# Patient Record
Sex: MALE | Race: WHITE | HISPANIC OR LATINO | Employment: FULL TIME | URBAN - METROPOLITAN AREA
[De-identification: names, ages, dates, MRNs, and addresses within clinical notes are randomized per-mention and may not be internally consistent; named-entity substitution may affect disease eponyms.]

---

## 2018-02-13 ENCOUNTER — OFFICE VISIT (OUTPATIENT)
Dept: URGENT CARE | Facility: PHYSICIAN GROUP | Age: 43
End: 2018-02-13
Payer: COMMERCIAL

## 2018-02-13 ENCOUNTER — APPOINTMENT (OUTPATIENT)
Dept: RADIOLOGY | Facility: IMAGING CENTER | Age: 43
End: 2018-02-13
Attending: NURSE PRACTITIONER
Payer: COMMERCIAL

## 2018-02-13 VITALS
HEART RATE: 74 BPM | BODY MASS INDEX: 28.12 KG/M2 | OXYGEN SATURATION: 94 % | WEIGHT: 212.2 LBS | HEIGHT: 73 IN | SYSTOLIC BLOOD PRESSURE: 112 MMHG | DIASTOLIC BLOOD PRESSURE: 70 MMHG | TEMPERATURE: 99.9 F

## 2018-02-13 DIAGNOSIS — J06.9 URI WITH COUGH AND CONGESTION: ICD-10-CM

## 2018-02-13 DIAGNOSIS — J45.31 MILD PERSISTENT ASTHMA WITH EXACERBATION: ICD-10-CM

## 2018-02-13 DIAGNOSIS — R06.2 WHEEZE: ICD-10-CM

## 2018-02-13 DIAGNOSIS — R05.8 PRODUCTIVE COUGH: ICD-10-CM

## 2018-02-13 DIAGNOSIS — R50.9 FEVER, UNSPECIFIED FEVER CAUSE: ICD-10-CM

## 2018-02-13 DIAGNOSIS — R09.81 NASAL CONGESTION WITH RHINORRHEA: ICD-10-CM

## 2018-02-13 DIAGNOSIS — J34.89 NASAL CONGESTION WITH RHINORRHEA: ICD-10-CM

## 2018-02-13 PROCEDURE — 94640 AIRWAY INHALATION TREATMENT: CPT | Performed by: NURSE PRACTITIONER

## 2018-02-13 PROCEDURE — 94760 N-INVAS EAR/PLS OXIMETRY 1: CPT | Performed by: NURSE PRACTITIONER

## 2018-02-13 PROCEDURE — 99204 OFFICE O/P NEW MOD 45 MIN: CPT | Mod: 25 | Performed by: NURSE PRACTITIONER

## 2018-02-13 PROCEDURE — 71046 X-RAY EXAM CHEST 2 VIEWS: CPT | Mod: TC | Performed by: NURSE PRACTITIONER

## 2018-02-13 RX ORDER — ACETAMINOPHEN 500 MG
500 TABLET ORAL ONCE
Status: DISCONTINUED | OUTPATIENT
Start: 2018-02-13 | End: 2018-02-13

## 2018-02-13 RX ORDER — DOXYCYCLINE HYCLATE 100 MG
100 TABLET ORAL 2 TIMES DAILY
Qty: 14 TAB | Refills: 0 | Status: SHIPPED | OUTPATIENT
Start: 2018-02-13 | End: 2020-07-20

## 2018-02-13 RX ORDER — CODEINE PHOSPHATE AND GUAIFENESIN 10; 100 MG/5ML; MG/5ML
5 SOLUTION ORAL 2 TIMES DAILY PRN
Qty: 100 ML | Refills: 0 | Status: SHIPPED | OUTPATIENT
Start: 2018-02-13 | End: 2018-02-23

## 2018-02-13 RX ORDER — ALPRAZOLAM 0.25 MG/1
0.25 TABLET ORAL NIGHTLY PRN
COMMUNITY
End: 2022-01-28

## 2018-02-13 RX ORDER — IPRATROPIUM BROMIDE AND ALBUTEROL SULFATE 2.5; .5 MG/3ML; MG/3ML
3 SOLUTION RESPIRATORY (INHALATION) ONCE
Status: COMPLETED | OUTPATIENT
Start: 2018-02-13 | End: 2018-02-13

## 2018-02-13 RX ORDER — FLUTICASONE PROPIONATE 50 MCG
2 SPRAY, SUSPENSION (ML) NASAL DAILY
Qty: 1 BOTTLE | Refills: 0 | Status: SHIPPED | OUTPATIENT
Start: 2018-02-13 | End: 2020-07-20

## 2018-02-13 RX ORDER — FLUOXETINE HYDROCHLORIDE 20 MG/1
20 CAPSULE ORAL DAILY
COMMUNITY
End: 2021-05-06

## 2018-02-13 RX ORDER — ACETAMINOPHEN 500 MG
1000 TABLET ORAL ONCE
Status: COMPLETED | OUTPATIENT
Start: 2018-02-13 | End: 2018-02-13

## 2018-02-13 RX ORDER — ALBUTEROL SULFATE 90 UG/1
2 AEROSOL, METERED RESPIRATORY (INHALATION) EVERY 6 HOURS PRN
Qty: 8.5 G | Refills: 0 | Status: SHIPPED | OUTPATIENT
Start: 2018-02-13 | End: 2022-01-28

## 2018-02-13 RX ADMIN — IPRATROPIUM BROMIDE AND ALBUTEROL SULFATE 3 ML: 2.5; .5 SOLUTION RESPIRATORY (INHALATION) at 09:02

## 2018-02-13 RX ADMIN — Medication 1000 MG: at 09:10

## 2018-02-13 ASSESSMENT — ENCOUNTER SYMPTOMS
BACK PAIN: 0
FEVER: 1
SPUTUM PRODUCTION: 1
WHEEZING: 1
ABDOMINAL PAIN: 0
DIARRHEA: 0
NAUSEA: 0
VOMITING: 0
PALPITATIONS: 0
MYALGIAS: 1
HEADACHES: 0
SORE THROAT: 1
CONSTIPATION: 0
SINUS PAIN: 0
SHORTNESS OF BREATH: 1
COUGH: 1
WEAKNESS: 0
ORTHOPNEA: 0
EYE DISCHARGE: 0
EYE REDNESS: 0
DIZZINESS: 0
CHILLS: 1

## 2018-02-13 NOTE — PROGRESS NOTES
"Subjective:      Joaquin Donaldson is a 42 y.o. male who presents with Fever (chest congestion, ear ache, painful swallowing, phlem, x2 days )            HPI  Chest congestion, brown/yellow/green. Fever at last night- sweats at night. Dayquil/Nyquil. Nasal congestion, runny nose- same mucus. Sore throat, PND.     PMH:  has no past medical history on file.  MEDS:   Current Outpatient Prescriptions:   •  ALPRAZolam (XANAX) 0.25 MG Tab, Take 0.25 mg by mouth at bedtime as needed for Sleep., Disp: , Rfl:   •  FLUoxetine (PROZAC) 20 MG Cap, Take 20 mg by mouth every day., Disp: , Rfl:     Current Facility-Administered Medications:   •  acetaminophen (TYLENOL) tablet 1,000 mg, 1,000 mg, Oral, Once, Sylwia Donaldson, SORAIDA.N.P.  ALLERGIES: Not on File  SURGHX: History reviewed. No pertinent surgical history.  SOCHX:  reports that he has never smoked. He has never used smokeless tobacco. He reports that he does not drink alcohol or use drugs.  FH: Family history was reviewed, no pertinent findings to report    Review of Systems   Constitutional: Positive for chills, fever and malaise/fatigue.   HENT: Positive for congestion, ear pain and sore throat. Negative for sinus pain.    Eyes: Negative for discharge and redness.   Respiratory: Positive for cough, sputum production, shortness of breath and wheezing.    Cardiovascular: Negative for chest pain, palpitations and orthopnea.   Gastrointestinal: Negative for abdominal pain, constipation, diarrhea, nausea and vomiting.   Musculoskeletal: Positive for myalgias. Negative for back pain.   Neurological: Negative for dizziness, weakness and headaches.   All other systems reviewed and are negative.         Objective:     /70   Pulse 74   Temp 37.7 °C (99.9 °F)   Ht 1.854 m (6' 1\")   Wt 96.3 kg (212 lb 3.2 oz)   SpO2 94%   BMI 28.00 kg/m²      Physical Exam   Constitutional: He is oriented to person, place, and time. He appears well-developed and well-nourished. He is " active and cooperative.  Non-toxic appearance. He does not have a sickly appearance. He appears ill. No distress.   HENT:   Head: Normocephalic.   Right Ear: External ear and ear canal normal. Tympanic membrane is injected.   Left Ear: External ear and ear canal normal. Tympanic membrane is injected.   Nose: Mucosal edema and rhinorrhea present. No sinus tenderness.   Mouth/Throat: Uvula is midline. Mucous membranes are dry. No uvula swelling. Posterior oropharyngeal erythema present. No posterior oropharyngeal edema.   Eyes: Conjunctivae and EOM are normal. Pupils are equal, round, and reactive to light.   Neck: Normal range of motion. Neck supple.   Cardiovascular: Normal rate and regular rhythm.    Pulmonary/Chest: Effort normal and breath sounds normal. No accessory muscle usage. No respiratory distress. He has no decreased breath sounds. He has no wheezes. He has no rhonchi. He has no rales.   Musculoskeletal: Normal range of motion.   Lymphadenopathy:     He has no cervical adenopathy.   Neurological: He is alert and oriented to person, place, and time.   Skin: Skin is warm and dry. He is not diaphoretic.   Vitals reviewed.       Duo neb breathing treatment: Patient has chest tightness, wheeze without CP. Cough induced especially with deep breathing. After, patient states able to breathe deep without coughing. Air movement throughout. Post tx 94%.      CXR FINDINGS:  The heart is within normal limits in size. No focal consolidation, pleural effusion or pneumothorax is identified.  Costophrenic angles are sharp.     [facility] Aware web site evaluation: I have obtained and reviewed patient utilization report from [facility] pharmacy database prior to writing prescription for controlled substance II, III or IV. Based on the report and my clinical assessment the prescription is medically necessary    Assessment/Plan:     1. Productive cough    - ipratropium-albuterol (DUONEB) nebulizer solution 3 mL; 3 mL by  Nebulization route Once.  - DX-CHEST-2 VIEWS; Future  - albuterol 108 (90 Base) MCG/ACT Aero Soln inhalation aerosol; Inhale 2 Puffs by mouth every 6 hours as needed for Shortness of Breath.  Dispense: 8.5 g; Refill: 0  - guaifenesin-codeine (ROBITUSSIN AC) Solution oral solution; Take 5 mL by mouth 2 times a day as needed for Cough for up to 10 days. Causes drowsiness, do not drive or work while using  Dispense: 100 mL; Refill: 0    2. Wheeze    - ipratropium-albuterol (DUONEB) nebulizer solution 3 mL; 3 mL by Nebulization route Once.  - DX-CHEST-2 VIEWS; Future  - albuterol 108 (90 Base) MCG/ACT Aero Soln inhalation aerosol; Inhale 2 Puffs by mouth every 6 hours as needed for Shortness of Breath.  Dispense: 8.5 g; Refill: 0    3. Fever, unspecified fever cause    - acetaminophen (TYLENOL) tablet 1,000 mg; Take 2 Tabs by mouth Once.    4. Mild persistent asthma with exacerbation    - albuterol 108 (90 Base) MCG/ACT Aero Soln inhalation aerosol; Inhale 2 Puffs by mouth every 6 hours as needed for Shortness of Breath.  Dispense: 8.5 g; Refill: 0    5. URI with cough and congestion    - albuterol 108 (90 Base) MCG/ACT Aero Soln inhalation aerosol; Inhale 2 Puffs by mouth every 6 hours as needed for Shortness of Breath.  Dispense: 8.5 g; Refill: 0  - doxycycline (VIBRAMYCIN) 100 MG Tab; Take 1 Tab by mouth 2 times a day.  Dispense: 14 Tab; Refill: 0  - guaifenesin-codeine (ROBITUSSIN AC) Solution oral solution; Take 5 mL by mouth 2 times a day as needed for Cough for up to 10 days. Causes drowsiness, do not drive or work while using  Dispense: 100 mL; Refill: 0    6. Nasal congestion with rhinorrhea    - fluticasone (FLONASE) 50 MCG/ACT nasal spray; Spray 2 Sprays in nose every day.  Dispense: 1 Bottle; Refill: 0      Increase water intake  Get rest  May use Ibuprofen/Tylenol prn for any fever, body aches or throat pain  May use saline nasal spray for nasal congestion  May use Flonase or Nasocort for allergen nasal  congestion  May use Mucinex prn for productive cough  May gargle with salt water prn for throat discomfort  May drink smoothies for nutrition if too painful to swallow solid foods  Monitor for productive cough, SOB and chest pain/tightness- need re-evaluation  Work note provided

## 2018-02-13 NOTE — LETTER
February 13, 2018       Patient: Joaquin Donaldson   YOB: 1975   Date of Visit: 2/13/2018         To Whom It May Concern:    It is my medical opinion that Joaquin Donaldson be excused from work due to illness. May return on 2/15/18.    If you have any questions or concerns, please don't hesitate to call [phone]          Sincerely,          SORAIDA Henriquez.N.P.  Electronically Signed

## 2019-05-11 ENCOUNTER — OFFICE VISIT (OUTPATIENT)
Dept: URGENT CARE | Facility: PHYSICIAN GROUP | Age: 44
End: 2019-05-11
Payer: COMMERCIAL

## 2019-05-11 VITALS
SYSTOLIC BLOOD PRESSURE: 134 MMHG | HEIGHT: 73 IN | DIASTOLIC BLOOD PRESSURE: 90 MMHG | TEMPERATURE: 97.9 F | HEART RATE: 74 BPM | WEIGHT: 227.6 LBS | OXYGEN SATURATION: 95 % | BODY MASS INDEX: 30.17 KG/M2

## 2019-05-11 DIAGNOSIS — J40 BRONCHITIS: ICD-10-CM

## 2019-05-11 DIAGNOSIS — R06.2 WHEEZING: ICD-10-CM

## 2019-05-11 PROCEDURE — 99214 OFFICE O/P EST MOD 30 MIN: CPT | Performed by: PHYSICIAN ASSISTANT

## 2019-05-11 RX ORDER — DOXYCYCLINE HYCLATE 100 MG
100 TABLET ORAL 2 TIMES DAILY
Qty: 14 TAB | Refills: 0 | Status: SHIPPED | OUTPATIENT
Start: 2019-05-11 | End: 2019-05-18

## 2019-05-11 RX ORDER — ALBUTEROL SULFATE 90 UG/1
2 AEROSOL, METERED RESPIRATORY (INHALATION) EVERY 6 HOURS PRN
Qty: 8.5 G | Refills: 0 | Status: SHIPPED | OUTPATIENT
Start: 2019-05-11 | End: 2020-07-20

## 2019-05-11 ASSESSMENT — ENCOUNTER SYMPTOMS
DIARRHEA: 0
NECK PAIN: 0
COUGH: 1
HEADACHES: 0
SHORTNESS OF BREATH: 0
MYALGIAS: 0
RHINORRHEA: 1
WHEEZING: 1
DIZZINESS: 0
TINGLING: 0
CHILLS: 0
SORE THROAT: 1
FEVER: 0
VOMITING: 0
EYE REDNESS: 0
SPUTUM PRODUCTION: 1
EYE DISCHARGE: 0

## 2019-05-11 NOTE — LETTER
May 11, 2019         Patient: Joaquin Donaldson   YOB: 1975   Date of Visit: 5/11/2019           To Whom it May Concern:    Joaquin Donaldson was seen in my clinic on 5/11/2019. Please excuse this patient from work due to recent illness.     If you have any questions or concerns, please don't hesitate to call.        Sincerely,           Esau Lebron P.A.-C.  Electronically Signed

## 2019-05-11 NOTE — PROGRESS NOTES
"Subjective:      Joaquin Donaldson is a 43 y.o. male who presents with Cough (wheezing, tight congestion, phlem (yellow/red), x1 week )            Cough   This is a new problem. The current episode started 1 to 4 weeks ago. The problem has been waxing and waning. The problem occurs every few minutes. The cough is productive of sputum. Associated symptoms include nasal congestion, rhinorrhea, a sore throat and wheezing. Pertinent negatives include no chest pain, chills, ear pain, eye redness, fever, headaches, myalgias, rash or shortness of breath. Nothing aggravates the symptoms. He has tried OTC cough suppressant for the symptoms. The treatment provided mild relief. His past medical history is significant for bronchitis.       Review of Systems   Constitutional: Positive for malaise/fatigue. Negative for chills and fever.   HENT: Positive for congestion, rhinorrhea and sore throat. Negative for ear discharge and ear pain.    Eyes: Negative for discharge and redness.   Respiratory: Positive for cough, sputum production and wheezing. Negative for shortness of breath.    Cardiovascular: Negative for chest pain.   Gastrointestinal: Negative for diarrhea and vomiting.   Genitourinary: Negative for dysuria and urgency.   Musculoskeletal: Negative for myalgias and neck pain.   Skin: Negative for rash.   Neurological: Negative for dizziness, tingling and headaches.          Objective:     /90 (BP Location: Right arm, Patient Position: Sitting, BP Cuff Size: Adult)   Pulse 74   Temp 36.6 °C (97.9 °F) (Temporal)   Ht 1.854 m (6' 1\")   Wt 103.2 kg (227 lb 9.6 oz)   SpO2 95%   BMI 30.03 kg/m²    PMH:  has no past medical history on file.  MEDS:   Current Outpatient Prescriptions:   •  doxycycline (VIBRAMYCIN) 100 MG Tab, Take 1 Tab by mouth 2 times a day for 7 days., Disp: 14 Tab, Rfl: 0  •  albuterol 108 (90 Base) MCG/ACT Aero Soln inhalation aerosol, Inhale 2 Puffs by mouth every 6 hours as needed for Shortness " of Breath., Disp: 8.5 g, Rfl: 0  •  ALPRAZolam (XANAX) 0.25 MG Tab, Take 0.25 mg by mouth at bedtime as needed for Sleep., Disp: , Rfl:   •  FLUoxetine (PROZAC) 20 MG Cap, Take 20 mg by mouth every day., Disp: , Rfl:   •  albuterol 108 (90 Base) MCG/ACT Aero Soln inhalation aerosol, Inhale 2 Puffs by mouth every 6 hours as needed for Shortness of Breath., Disp: 8.5 g, Rfl: 0  •  fluticasone (FLONASE) 50 MCG/ACT nasal spray, Spray 2 Sprays in nose every day., Disp: 1 Bottle, Rfl: 0  •  doxycycline (VIBRAMYCIN) 100 MG Tab, Take 1 Tab by mouth 2 times a day., Disp: 14 Tab, Rfl: 0  ALLERGIES: Not on File  SURGHX: History reviewed. No pertinent surgical history.  SOCHX:  reports that he has never smoked. He has never used smokeless tobacco. He reports that he drinks alcohol. He reports that he does not use drugs.  FH: Family history was reviewed, no pertinent findings to report    Physical Exam   Constitutional: He is oriented to person, place, and time. He appears well-developed and well-nourished.   HENT:   Head: Normocephalic and atraumatic.   Mouth/Throat: No oropharyngeal exudate.   Ears- Canals clear- TM- with clear fluid effusions bilaterally.   Pos. PND, with slight erythema- without tonsillar edema or exudate.   Mild discharge noted bilaterally- to nares.      Eyes: Pupils are equal, round, and reactive to light. EOM are normal.   Neck: Normal range of motion. Neck supple.   Cardiovascular: Normal rate and regular rhythm.    No murmur heard.  Pulmonary/Chest: Effort normal. No respiratory distress. He has wheezes.   Musculoskeletal: Normal range of motion. He exhibits no tenderness.   Lymphadenopathy:     He has no cervical adenopathy.   Neurological: He is alert and oriented to person, place, and time.   Skin: Skin is warm. No rash noted.   Psychiatric: He has a normal mood and affect. His behavior is normal.   Vitals reviewed.              Assessment/Plan:     1. Bronchitis  - doxycycline (VIBRAMYCIN) 100 MG  Tab; Take 1 Tab by mouth 2 times a day for 7 days.  Dispense: 14 Tab; Refill: 0  - albuterol 108 (90 Base) MCG/ACT Aero Soln inhalation aerosol; Inhale 2 Puffs by mouth every 6 hours as needed for Shortness of Breath.  Dispense: 8.5 g; Refill: 0    2. Wheezing  - doxycycline (VIBRAMYCIN) 100 MG Tab; Take 1 Tab by mouth 2 times a day for 7 days.  Dispense: 14 Tab; Refill: 0  - albuterol 108 (90 Base) MCG/ACT Aero Soln inhalation aerosol; Inhale 2 Puffs by mouth every 6 hours as needed for Shortness of Breath.  Dispense: 8.5 g; Refill: 0    Also encouraged histamine today.  Discussed warning medication and the importance of avoiding sun.  Encouraged OTC supportive meds PRN. Humidification, increase fluids, avoid night time dairy.   Patient given precautionary s/sx that mandate immediate follow up and evaluation in the ED. Advised of risks of not doing so.    DDX, Supportive care, and indications for immediate follow-up discussed with patient.    Instructed to return to clinic or nearest emergency department if we are not available for any change in condition, further concerns, or worsening of symptoms.    The patient demonstrated a good understanding and agreed with the treatment plan.

## 2020-01-11 ENCOUNTER — OFFICE VISIT (OUTPATIENT)
Dept: URGENT CARE | Facility: PHYSICIAN GROUP | Age: 45
End: 2020-01-11
Payer: COMMERCIAL

## 2020-01-11 ENCOUNTER — APPOINTMENT (OUTPATIENT)
Dept: RADIOLOGY | Facility: IMAGING CENTER | Age: 45
End: 2020-01-11
Attending: PHYSICIAN ASSISTANT
Payer: COMMERCIAL

## 2020-01-11 VITALS
HEART RATE: 96 BPM | TEMPERATURE: 99.6 F | WEIGHT: 220.4 LBS | SYSTOLIC BLOOD PRESSURE: 128 MMHG | HEIGHT: 73 IN | BODY MASS INDEX: 29.21 KG/M2 | RESPIRATION RATE: 20 BRPM | DIASTOLIC BLOOD PRESSURE: 82 MMHG | OXYGEN SATURATION: 94 %

## 2020-01-11 DIAGNOSIS — J10.1 INFLUENZA A: ICD-10-CM

## 2020-01-11 DIAGNOSIS — J11.1 INFLUENZA-LIKE ILLNESS: ICD-10-CM

## 2020-01-11 DIAGNOSIS — R06.02 SHORTNESS OF BREATH: ICD-10-CM

## 2020-01-11 LAB
FLUAV+FLUBV AG SPEC QL IA: NORMAL
INT CON NEG: NEGATIVE
INT CON POS: POSITIVE

## 2020-01-11 PROCEDURE — 71046 X-RAY EXAM CHEST 2 VIEWS: CPT | Mod: TC | Performed by: PHYSICIAN ASSISTANT

## 2020-01-11 PROCEDURE — 99214 OFFICE O/P EST MOD 30 MIN: CPT | Performed by: PHYSICIAN ASSISTANT

## 2020-01-11 PROCEDURE — 87804 INFLUENZA ASSAY W/OPTIC: CPT | Performed by: PHYSICIAN ASSISTANT

## 2020-01-11 RX ORDER — CODEINE PHOSPHATE AND GUAIFENESIN 10; 100 MG/5ML; MG/5ML
5 SOLUTION ORAL
Qty: 35 ML | Refills: 0 | Status: SHIPPED | OUTPATIENT
Start: 2020-01-11 | End: 2020-01-18

## 2020-01-11 RX ORDER — OSELTAMIVIR PHOSPHATE 75 MG/1
75 CAPSULE ORAL 2 TIMES DAILY
Qty: 10 CAP | Refills: 0 | Status: SHIPPED | OUTPATIENT
Start: 2020-01-11 | End: 2020-07-20

## 2020-01-11 RX ORDER — METHYLPREDNISOLONE 4 MG/1
TABLET ORAL
Qty: 1 PACKAGE | Refills: 0 | Status: SHIPPED | OUTPATIENT
Start: 2020-01-11 | End: 2020-07-20

## 2020-01-11 RX ORDER — ALBUTEROL SULFATE 90 UG/1
2 AEROSOL, METERED RESPIRATORY (INHALATION) EVERY 6 HOURS PRN
Qty: 8.5 G | Refills: 0 | Status: SHIPPED | OUTPATIENT
Start: 2020-01-11 | End: 2020-07-20

## 2020-01-11 RX ORDER — IPRATROPIUM BROMIDE AND ALBUTEROL SULFATE 2.5; .5 MG/3ML; MG/3ML
3 SOLUTION RESPIRATORY (INHALATION) ONCE
Status: COMPLETED | OUTPATIENT
Start: 2020-01-11 | End: 2020-01-11

## 2020-01-11 RX ADMIN — IPRATROPIUM BROMIDE AND ALBUTEROL SULFATE 3 ML: 2.5; .5 SOLUTION RESPIRATORY (INHALATION) at 14:35

## 2020-01-11 ASSESSMENT — ENCOUNTER SYMPTOMS
CHANGE IN BOWEL HABIT: 0
EYE PAIN: 0
CHILLS: 1
DIARRHEA: 0
SHORTNESS OF BREATH: 1
SORE THROAT: 1
FATIGUE: 1
FEVER: 1
COUGH: 1
BLURRED VISION: 0
WHEEZING: 1
DIZZINESS: 0
SINUS PAIN: 0
NAUSEA: 1
ABDOMINAL PAIN: 0
SPUTUM PRODUCTION: 1
PALPITATIONS: 0
MYALGIAS: 1
VOMITING: 0
HEADACHES: 1

## 2020-01-11 NOTE — PROGRESS NOTES
Subjective:      Joaquin Donaldson is a 44 y.o. male who presents with Cough (chest pains when he coughs, coughing up mucous, very painful , wheezing, he thinks he has bronchitis, chills, hot sweats, fevers, sore throat, x3 days )      Influenza   This is a new problem. The current episode started in the past 7 days (3 DAYS). The problem occurs constantly. The problem has been gradually worsening. Associated symptoms include chest pain (with coughing and taking deep breaths), chills, congestion, coughing, fatigue, a fever, headaches, myalgias, nausea and a sore throat. Pertinent negatives include no abdominal pain, change in bowel habit, rash or vomiting. The symptoms are aggravated by coughing (deep breaths). He has tried NSAIDs and sleep (Mucinex, Robitussin) for the symptoms. The treatment provided no relief.       Review of Systems   Constitutional: Positive for chills, fatigue, fever and malaise/fatigue.   HENT: Positive for congestion and sore throat. Negative for ear pain and sinus pain.    Eyes: Negative for blurred vision and pain.   Respiratory: Positive for cough, sputum production, shortness of breath and wheezing.    Cardiovascular: Positive for chest pain (with coughing and taking deep breaths). Negative for palpitations.   Gastrointestinal: Positive for nausea. Negative for abdominal pain, change in bowel habit, diarrhea and vomiting.   Musculoskeletal: Positive for myalgias.   Skin: Negative for rash.   Neurological: Positive for headaches. Negative for dizziness.       PMH:  has no past medical history on file.  MEDS:   Current Outpatient Medications:   •  oseltamivir (TAMIFLU) 75 MG Cap, Take 1 Cap by mouth 2 times a day., Disp: 10 Cap, Rfl: 0  •  guaifenesin-codeine (ROBITUSSIN AC) Solution oral solution, Take 5 mL by mouth at bedtime as needed for Cough for up to 7 days., Disp: 35 mL, Rfl: 0  •  methylPREDNISolone (MEDROL DOSEPAK) 4 MG Tablet Therapy Pack, Take as directed, Disp: 1 Package, Rfl:  "0  •  albuterol 108 (90 Base) MCG/ACT Aero Soln inhalation aerosol, Inhale 2 Puffs by mouth every 6 hours as needed for Shortness of Breath., Disp: 8.5 g, Rfl: 0  •  albuterol 108 (90 Base) MCG/ACT Aero Soln inhalation aerosol, Inhale 2 Puffs by mouth every 6 hours as needed for Shortness of Breath., Disp: 8.5 g, Rfl: 0  •  FLUoxetine (PROZAC) 20 MG Cap, Take 20 mg by mouth every day., Disp: , Rfl:   •  albuterol 108 (90 Base) MCG/ACT Aero Soln inhalation aerosol, Inhale 2 Puffs by mouth every 6 hours as needed for Shortness of Breath., Disp: 8.5 g, Rfl: 0  •  ALPRAZolam (XANAX) 0.25 MG Tab, Take 0.25 mg by mouth at bedtime as needed for Sleep., Disp: , Rfl:   •  fluticasone (FLONASE) 50 MCG/ACT nasal spray, Spray 2 Sprays in nose every day. (Patient not taking: Reported on 1/11/2020), Disp: 1 Bottle, Rfl: 0  •  doxycycline (VIBRAMYCIN) 100 MG Tab, Take 1 Tab by mouth 2 times a day. (Patient not taking: Reported on 1/11/2020), Disp: 14 Tab, Rfl: 0  ALLERGIES: No Known Allergies  SURGHX: History reviewed. No pertinent surgical history.  SOCHX:  reports that he has never smoked. He has never used smokeless tobacco. He reports current alcohol use. He reports that he does not use drugs.  FH: Family history was reviewed, no pertinent findings to report     Objective:     /82 (BP Location: Left arm, Patient Position: Sitting, BP Cuff Size: Adult)   Pulse 96   Temp 37.6 °C (99.6 °F) (Temporal)   Resp 20   Ht 1.854 m (6' 1\")   Wt 100 kg (220 lb 6.4 oz)   SpO2 94%   BMI 29.08 kg/m²      Physical Exam  Constitutional:       Appearance: He is well-developed.   HENT:      Head: Normocephalic and atraumatic.      Right Ear: Tympanic membrane, ear canal and external ear normal.      Left Ear: Tympanic membrane, ear canal and external ear normal.      Nose: Mucosal edema, congestion and rhinorrhea present. Rhinorrhea is clear.      Right Sinus: No maxillary sinus tenderness or frontal sinus tenderness.      Left " Sinus: No maxillary sinus tenderness or frontal sinus tenderness.      Mouth/Throat:      Lips: Pink.      Mouth: Mucous membranes are moist.      Pharynx: Posterior oropharyngeal erythema present.   Eyes:      Conjunctiva/sclera: Conjunctivae normal.      Pupils: Pupils are equal, round, and reactive to light.   Neck:      Musculoskeletal: Normal range of motion.   Cardiovascular:      Rate and Rhythm: Normal rate and regular rhythm.      Heart sounds: Normal heart sounds. No murmur.   Pulmonary:      Effort: Pulmonary effort is normal.      Breath sounds: Wheezing present. No decreased breath sounds, rhonchi or rales.   Lymphadenopathy:      Cervical: No cervical adenopathy.   Skin:     General: Skin is warm and dry.      Capillary Refill: Capillary refill takes less than 2 seconds.   Neurological:      Mental Status: He is alert and oriented to person, place, and time.   Psychiatric:         Behavior: Behavior normal.         Judgment: Judgment normal.         POCT Influenza A/B - POSITIVE for influenza A    DX-CHEST-2 VIEWS  IMPRESSION:     No radiographic evidence of acute cardiopulmonary process.        *Repeat pulse ox s/p nebulizer treatment was 97% on room air.  Patient was breathing easier but wheezes were still heard diffusely.     Assessment/Plan:       1. Influenza A  - POCT Influenza A/B  - DX-CHEST-2 VIEWS; Future  - ipratropium-albuterol (DUONEB) nebulizer solution  - oseltamivir (TAMIFLU) 75 MG Cap; Take 1 Cap by mouth 2 times a day.  Dispense: 10 Cap; Refill: 0  - guaifenesin-codeine (ROBITUSSIN AC) Solution oral solution; Take 5 mL by mouth at bedtime as needed for Cough for up to 7 days.  Dispense: 35 mL; Refill: 0  - methylPREDNISolone (MEDROL DOSEPAK) 4 MG Tablet Therapy Pack; Take as directed  Dispense: 1 Package; Refill: 0    2. Shortness of breath  - POCT Influenza A/B  - DX-CHEST-2 VIEWS; Future  - ipratropium-albuterol (DUONEB) nebulizer solution  - albuterol 108 (90 Base) MCG/ACT Aero  Soln inhalation aerosol; Inhale 2 Puffs by mouth every 6 hours as needed for Shortness of Breath.  Dispense: 8.5 g; Refill: 0            Differential Diagnosis, natural history, and supportive care discussed. Return to the Urgent Care or follow up with your PCP if symptoms fail to resolve, or for any new or worsening symptoms. Emergency room precautions discussed. Patient and/or family appears understanding of information.

## 2020-01-21 ENCOUNTER — OFFICE VISIT (OUTPATIENT)
Dept: URGENT CARE | Facility: PHYSICIAN GROUP | Age: 45
End: 2020-01-21
Payer: COMMERCIAL

## 2020-01-21 VITALS
WEIGHT: 220 LBS | OXYGEN SATURATION: 99 % | HEIGHT: 73 IN | DIASTOLIC BLOOD PRESSURE: 76 MMHG | RESPIRATION RATE: 16 BRPM | TEMPERATURE: 97.9 F | BODY MASS INDEX: 29.16 KG/M2 | SYSTOLIC BLOOD PRESSURE: 118 MMHG | HEART RATE: 84 BPM

## 2020-01-21 DIAGNOSIS — R05.9 COUGH: Primary | ICD-10-CM

## 2020-01-21 DIAGNOSIS — J98.01 BRONCHOSPASM, ACUTE: ICD-10-CM

## 2020-01-21 DIAGNOSIS — R04.2 BLOOD-TINGED SPUTUM: ICD-10-CM

## 2020-01-21 PROCEDURE — 99214 OFFICE O/P EST MOD 30 MIN: CPT | Performed by: PHYSICIAN ASSISTANT

## 2020-01-21 RX ORDER — BENZONATATE 200 MG/1
200 CAPSULE ORAL 3 TIMES DAILY PRN
Qty: 60 CAP | Refills: 0 | Status: SHIPPED | OUTPATIENT
Start: 2020-01-21 | End: 2020-07-20

## 2020-01-21 RX ORDER — CODEINE PHOSPHATE/GUAIFENESIN 10-100MG/5
10 LIQUID (ML) ORAL 4 TIMES DAILY PRN
Qty: 200 ML | Refills: 0 | Status: SHIPPED | OUTPATIENT
Start: 2020-01-21 | End: 2020-01-26

## 2020-01-21 ASSESSMENT — ENCOUNTER SYMPTOMS
SPUTUM PRODUCTION: 1
RHINORRHEA: 0
WHEEZING: 0
FEVER: 0
SHORTNESS OF BREATH: 0
HEMOPTYSIS: 1
COUGH: 1

## 2020-01-21 ASSESSMENT — COPD QUESTIONNAIRES: COPD: 0

## 2020-01-21 NOTE — PATIENT INSTRUCTIONS
Hemoptysis  Hemoptysis means coughing up blood from your airways or lungs. The most common cause of hemoptysis is the least serious. It is usually a ruptured small blood vessel caused by coughing or an infection. In some cases, the cause of hemoptysis is not known. Hemoptysis may also be a sign of a more serious problem, such as cancer, pneumonia, a blood clot, or other types of lung disease. You should always contact a caregiver if you develop hemoptysis. This is important, as even mild cases of hemoptysis may lead to serious breathing problems. Major bleeding from the airway is considered a medical emergency, and needs to be evaluated and managed promptly to avoid complications, disability, or death.  Your caregiver may perform tests to find out if the bleeding is coming from your lungs. Some of these tests may include:  · A chest X-ray.   · A computerized X-ray scan (CT scan or CAT scan).   · Bronchoscopy. This test uses a flexible tube (a bronchoscope) to see inside the lungs.   TREATMENT   · Treatment for hemoptysis depends on the cause. It also depends on the quantity of blood. Infrequent, mild hemoptysis usually does not require specific, immediate treatment.   · If the cause of hemoptysis is unknown, treatment may involve monitoring for at least 2 or 3 years. If you have a normal chest X-ray and bronchoscopy, the hemoptysis usually clears within 6 months.   SEEK MEDICAL CARE IF:   · For follow-up care as directed.   · If your symptoms are not improving or are getting worse.   · If you have any other questions or concerns.   SEEK IMMEDIATE MEDICAL CARE IF:   · You begin to cough up large amounts of blood.   · You develop problems with your breathing.   · You begin vomiting blood or see blood in your stool.   · You develop chest pain.   · You feel faint or pass out.   · You develop a fever over 102° F (38.9° C), or as your caregiver suggests.   Document Released: 01/25/2006 Document Revised: 03/11/2013  Document Reviewed: 05/03/2011  [employer]® Patient Information ©2013 [employer].

## 2020-01-21 NOTE — PROGRESS NOTES
Subjective:      Joaquin Donaldson is a 44 y.o. male who presents with Cough (not getting better, spitting up bloody mucus )    PMH:  has no past medical history on file.  MEDS:   Current Outpatient Medications:   •  benzonatate (TESSALON) 200 MG capsule, Take 1 Cap by mouth 3 times a day as needed for Cough., Disp: 60 Cap, Rfl: 0  •  guaifenesin-codeine (TUSSI-ORGANIDIN NR) 100-10 MG/5ML syrup, Take 10 mL by mouth 4 times a day as needed for up to 5 days., Disp: 200 mL, Rfl: 0  •  oseltamivir (TAMIFLU) 75 MG Cap, Take 1 Cap by mouth 2 times a day., Disp: 10 Cap, Rfl: 0  •  methylPREDNISolone (MEDROL DOSEPAK) 4 MG Tablet Therapy Pack, Take as directed, Disp: 1 Package, Rfl: 0  •  albuterol 108 (90 Base) MCG/ACT Aero Soln inhalation aerosol, Inhale 2 Puffs by mouth every 6 hours as needed for Shortness of Breath., Disp: 8.5 g, Rfl: 0  •  albuterol 108 (90 Base) MCG/ACT Aero Soln inhalation aerosol, Inhale 2 Puffs by mouth every 6 hours as needed for Shortness of Breath., Disp: 8.5 g, Rfl: 0  •  ALPRAZolam (XANAX) 0.25 MG Tab, Take 0.25 mg by mouth at bedtime as needed for Sleep., Disp: , Rfl:   •  FLUoxetine (PROZAC) 20 MG Cap, Take 20 mg by mouth every day., Disp: , Rfl:   •  albuterol 108 (90 Base) MCG/ACT Aero Soln inhalation aerosol, Inhale 2 Puffs by mouth every 6 hours as needed for Shortness of Breath., Disp: 8.5 g, Rfl: 0  •  fluticasone (FLONASE) 50 MCG/ACT nasal spray, Spray 2 Sprays in nose every day. (Patient not taking: Reported on 1/11/2020), Disp: 1 Bottle, Rfl: 0  •  doxycycline (VIBRAMYCIN) 100 MG Tab, Take 1 Tab by mouth 2 times a day. (Patient not taking: Reported on 1/11/2020), Disp: 14 Tab, Rfl: 0  ALLERGIES: No Known Allergies  SURGHX: History reviewed. No pertinent surgical history.  SOCHX:  reports that he has never smoked. He has never used smokeless tobacco. He reports current alcohol use. He reports that he does not use drugs.  FH: Reviewed with patient, not pertinent to this visit.        "    Patient presents with:  Cough: Patient was diagnosed with influenza 10 days ago, the remainder of the symptoms have improved and mostly resolved but the cough has been persistent.  Patient states occasionally his sputum is pink-tinged or sometimes has speckles of blood in it.  Patient states he thinks is just from his throat or esophagus as it is pretty sore from coughing but he thought he should come in to be evaluated.  Patient denies fever, chills, purulent or brown phlegm.    Cough   This is a new problem. Episode onset: 10 days. The problem has been unchanged. The problem occurs every few minutes. The cough is productive of blood-tinged sputum. Associated symptoms include hemoptysis and postnasal drip. Pertinent negatives include no fever, rhinorrhea, shortness of breath or wheezing. The symptoms are aggravated by lying down and cold air. He has tried body position changes and OTC cough suppressant for the symptoms. The treatment provided mild relief. There is no history of bronchitis, COPD or pneumonia.       Review of Systems   Constitutional: Negative for fever.   HENT: Positive for postnasal drip. Negative for rhinorrhea.    Respiratory: Positive for cough, hemoptysis and sputum production. Negative for shortness of breath and wheezing.    All other systems reviewed and are negative.         Objective:     /76   Pulse 84   Temp 36.6 °C (97.9 °F)   Resp 16   Ht 1.854 m (6' 1\")   Wt 99.8 kg (220 lb)   SpO2 99%   BMI 29.03 kg/m²      Physical Exam  Vitals signs and nursing note reviewed.   Constitutional:       General: He is not in acute distress.     Appearance: He is well-developed. He is not toxic-appearing.   HENT:      Head: Normocephalic and atraumatic.      Right Ear: Tympanic membrane normal.      Left Ear: Tympanic membrane normal.      Nose: Mucosal edema present.      Mouth/Throat:      Lips: Pink.      Mouth: Mucous membranes are moist.      Pharynx: Uvula midline. Posterior " oropharyngeal erythema present. No oropharyngeal exudate or uvula swelling.   Eyes:      Extraocular Movements: Extraocular movements intact.      Conjunctiva/sclera: Conjunctivae normal.      Pupils: Pupils are equal, round, and reactive to light.   Neck:      Musculoskeletal: Normal range of motion and neck supple.   Cardiovascular:      Rate and Rhythm: Normal rate and regular rhythm.      Heart sounds: Normal heart sounds.   Pulmonary:      Effort: Pulmonary effort is normal.      Breath sounds: No stridor. No decreased breath sounds, wheezing or rhonchi.   Abdominal:      Palpations: Abdomen is soft.   Musculoskeletal: Normal range of motion.   Lymphadenopathy:      Cervical: No cervical adenopathy.   Skin:     General: Skin is warm and dry.      Capillary Refill: Capillary refill takes less than 2 seconds.   Neurological:      General: No focal deficit present.      Mental Status: He is alert and oriented to person, place, and time.      Gait: Gait normal.   Psychiatric:         Mood and Affect: Mood normal.         Behavior: Behavior is cooperative.              Assessment/Plan:     1. Cough  benzonatate (TESSALON) 200 MG capsule    guaifenesin-codeine (TUSSI-ORGANIDIN NR) 100-10 MG/5ML syrup   2. Bronchospasm, acute  benzonatate (TESSALON) 200 MG capsule    guaifenesin-codeine (TUSSI-ORGANIDIN NR) 100-10 MG/5ML syrup   3. Blood-tinged sputum  benzonatate (TESSALON) 200 MG capsule    guaifenesin-codeine (TUSSI-ORGANIDIN NR) 100-10 MG/5ML syrup     Blood-tinged sputum is likely coming from patient's throat or esophagus as he has been coughing almost constantly for 2 weeks.    Patient declined chest x-ray at this visit today, states he had a chest x-ray at his last visit which was negative and does not feel worse respiratory wise, again he was just concerned about the blood-tinged sputum.    Patient given a prescription for benzonatate for his cough that he can actually take at work which she could not do with  the codeine cough medicine.    Patient given another prescription for codeine cough medicine for bedtime.  PT instructed not to drive or operate heavy machinery or drink alcohol while taking this medication because it contains either a narcotic or benzodiazepines which causes drowsiness. PT verbalized understanding of these instructions.     [facility] Aware web site evaluation: I have obtained and reviewed patient utilization report from [facility] database prior to writing prescription for controlled substance.  No history of abuse.    PT should follow up with PCP in 1-2 days for re-evaluation if symptoms have not improved.  Discussed red flags and reasons to return to  or ED.  Pt and/or family verbalized understanding of diagnosis and follow up instructions and was offered informational handout on diagnosis.  PT discharged.

## 2020-07-02 ENCOUNTER — HOSPITAL ENCOUNTER (OUTPATIENT)
Dept: RADIOLOGY | Facility: MEDICAL CENTER | Age: 45
End: 2020-07-02
Attending: SURGERY | Admitting: SURGERY
Payer: COMMERCIAL

## 2020-07-02 DIAGNOSIS — K40.91 UNILATERAL RECURRENT INGUINAL HERNIA WITHOUT OBSTRUCTION OR GANGRENE: ICD-10-CM

## 2020-07-02 PROCEDURE — 76857 US EXAM PELVIC LIMITED: CPT

## 2020-07-20 ENCOUNTER — OFFICE VISIT (OUTPATIENT)
Dept: ADMISSIONS | Facility: MEDICAL CENTER | Age: 45
End: 2020-07-20
Attending: SURGERY
Payer: COMMERCIAL

## 2020-07-20 DIAGNOSIS — Z01.812 PRE-OPERATIVE LABORATORY EXAMINATION: ICD-10-CM

## 2020-07-20 LAB
ANION GAP SERPL CALC-SCNC: 9 MMOL/L (ref 7–16)
BASOPHILS # BLD AUTO: 0.5 % (ref 0–1.8)
BASOPHILS # BLD: 0.05 K/UL (ref 0–0.12)
BUN SERPL-MCNC: 13 MG/DL (ref 8–22)
CALCIUM SERPL-MCNC: 9.2 MG/DL (ref 8.5–10.5)
CHLORIDE SERPL-SCNC: 104 MMOL/L (ref 96–112)
CO2 SERPL-SCNC: 25 MMOL/L (ref 20–33)
COVID ORDER STATUS COVID19: NORMAL
CREAT SERPL-MCNC: 1.01 MG/DL (ref 0.5–1.4)
EOSINOPHIL # BLD AUTO: 0.03 K/UL (ref 0–0.51)
EOSINOPHIL NFR BLD: 0.3 % (ref 0–6.9)
ERYTHROCYTE [DISTWIDTH] IN BLOOD BY AUTOMATED COUNT: 40.1 FL (ref 35.9–50)
GLUCOSE SERPL-MCNC: 99 MG/DL (ref 65–99)
HCT VFR BLD AUTO: 53.9 % (ref 42–52)
HGB BLD-MCNC: 18.4 G/DL (ref 14–18)
IMM GRANULOCYTES # BLD AUTO: 0.04 K/UL (ref 0–0.11)
IMM GRANULOCYTES NFR BLD AUTO: 0.4 % (ref 0–0.9)
LYMPHOCYTES # BLD AUTO: 1.29 K/UL (ref 1–4.8)
LYMPHOCYTES NFR BLD: 12.9 % (ref 22–41)
MCH RBC QN AUTO: 31.3 PG (ref 27–33)
MCHC RBC AUTO-ENTMCNC: 34.1 G/DL (ref 33.7–35.3)
MCV RBC AUTO: 91.7 FL (ref 81.4–97.8)
MONOCYTES # BLD AUTO: 0.41 K/UL (ref 0–0.85)
MONOCYTES NFR BLD AUTO: 4.1 % (ref 0–13.4)
NEUTROPHILS # BLD AUTO: 8.16 K/UL (ref 1.82–7.42)
NEUTROPHILS NFR BLD: 81.8 % (ref 44–72)
NRBC # BLD AUTO: 0 K/UL
NRBC BLD-RTO: 0 /100 WBC
PLATELET # BLD AUTO: 210 K/UL (ref 164–446)
PMV BLD AUTO: 10.3 FL (ref 9–12.9)
POTASSIUM SERPL-SCNC: 3.9 MMOL/L (ref 3.6–5.5)
RBC # BLD AUTO: 5.88 M/UL (ref 4.7–6.1)
SODIUM SERPL-SCNC: 138 MMOL/L (ref 135–145)
WBC # BLD AUTO: 10 K/UL (ref 4.8–10.8)

## 2020-07-20 PROCEDURE — 85025 COMPLETE CBC W/AUTO DIFF WBC: CPT

## 2020-07-20 PROCEDURE — 36415 COLL VENOUS BLD VENIPUNCTURE: CPT

## 2020-07-20 PROCEDURE — 80048 BASIC METABOLIC PNL TOTAL CA: CPT

## 2020-07-20 PROCEDURE — U0003 INFECTIOUS AGENT DETECTION BY NUCLEIC ACID (DNA OR RNA); SEVERE ACUTE RESPIRATORY SYNDROME CORONAVIRUS 2 (SARS-COV-2) (CORONAVIRUS DISEASE [COVID-19]), AMPLIFIED PROBE TECHNIQUE, MAKING USE OF HIGH THROUGHPUT TECHNOLOGIES AS DESCRIBED BY CMS-2020-01-R: HCPCS

## 2020-07-20 SDOH — HEALTH STABILITY: MENTAL HEALTH: HOW MANY STANDARD DRINKS CONTAINING ALCOHOL DO YOU HAVE ON A TYPICAL DAY?: 1 OR 2

## 2020-07-20 SDOH — HEALTH STABILITY: MENTAL HEALTH: HOW OFTEN DO YOU HAVE A DRINK CONTAINING ALCOHOL?: 4 OR MORE TIMES A WEEK

## 2020-07-21 LAB
SARS-COV-2 RNA RESP QL NAA+PROBE: NOTDETECTED
SPECIMEN SOURCE: NORMAL

## 2020-07-22 NOTE — OR NURSING
COVID-19 Pre-surgery screenin. Do you have an undiagnosed respiratory illness or symptoms such as coughing or sneezing? No  a. Onset of Sx No  b. Acute vs. chronic respiratory illness No    2. Do you have an unexplained fever greater than 100.4 degrees Fahrenheit or 38 degrees Celsius?     No    3. Have you had direct exposure to a patient who tested positive for Covid-19?    No     4. Have you traveled outside [facility] in the last 14 days? No    5. Have you had any loss of your sense of taste or smell? Have you had N/V or sore throat? No    Patient has been informed of visitor policy and asked to wear a mask upon entering the hospital   Yes

## 2020-07-23 ENCOUNTER — HOSPITAL ENCOUNTER (OUTPATIENT)
Facility: MEDICAL CENTER | Age: 45
End: 2020-07-23
Attending: SURGERY | Admitting: SURGERY
Payer: COMMERCIAL

## 2020-07-23 ENCOUNTER — ANESTHESIA EVENT (OUTPATIENT)
Dept: SURGERY | Facility: MEDICAL CENTER | Age: 45
End: 2020-07-23
Payer: COMMERCIAL

## 2020-07-23 ENCOUNTER — ANESTHESIA (OUTPATIENT)
Dept: SURGERY | Facility: MEDICAL CENTER | Age: 45
End: 2020-07-23
Payer: COMMERCIAL

## 2020-07-23 VITALS
HEART RATE: 59 BPM | TEMPERATURE: 97 F | SYSTOLIC BLOOD PRESSURE: 108 MMHG | WEIGHT: 198.41 LBS | RESPIRATION RATE: 16 BRPM | OXYGEN SATURATION: 96 % | BODY MASS INDEX: 26.3 KG/M2 | DIASTOLIC BLOOD PRESSURE: 87 MMHG | HEIGHT: 73 IN

## 2020-07-23 DIAGNOSIS — G89.18 POST-OP PAIN: ICD-10-CM

## 2020-07-23 PROBLEM — K40.20 BILATERAL INGUINAL HERNIA WITHOUT OBSTRUCTION OR GANGRENE: Status: ACTIVE | Noted: 2020-07-23

## 2020-07-23 PROBLEM — F17.200 SMOKER: Status: ACTIVE | Noted: 2020-07-23

## 2020-07-23 PROBLEM — F12.90 MARIJUANA SMOKER: Status: ACTIVE | Noted: 2020-07-23

## 2020-07-23 PROBLEM — F17.200 TOBACCO DEPENDENCY: Status: ACTIVE | Noted: 2020-07-23

## 2020-07-23 PROCEDURE — 700105 HCHG RX REV CODE 258: Performed by: SURGERY

## 2020-07-23 PROCEDURE — 160009 HCHG ANES TIME/MIN: Performed by: SURGERY

## 2020-07-23 PROCEDURE — 160036 HCHG PACU - EA ADDL 30 MINS PHASE I: Performed by: SURGERY

## 2020-07-23 PROCEDURE — 160025 RECOVERY II MINUTES (STATS): Performed by: SURGERY

## 2020-07-23 PROCEDURE — A9270 NON-COVERED ITEM OR SERVICE: HCPCS | Performed by: ANESTHESIOLOGY

## 2020-07-23 PROCEDURE — 502714 HCHG ROBOTIC SURGERY SERVICES: Performed by: SURGERY

## 2020-07-23 PROCEDURE — 501838 HCHG SUTURE GENERAL: Performed by: SURGERY

## 2020-07-23 PROCEDURE — 160041 HCHG SURGERY MINUTES - EA ADDL 1 MIN LEVEL 4: Performed by: SURGERY

## 2020-07-23 PROCEDURE — 700111 HCHG RX REV CODE 636 W/ 250 OVERRIDE (IP): Performed by: ANESTHESIOLOGY

## 2020-07-23 PROCEDURE — 160048 HCHG OR STATISTICAL LEVEL 1-5: Performed by: SURGERY

## 2020-07-23 PROCEDURE — 160002 HCHG RECOVERY MINUTES (STAT): Performed by: SURGERY

## 2020-07-23 PROCEDURE — A9270 NON-COVERED ITEM OR SERVICE: HCPCS

## 2020-07-23 PROCEDURE — 160029 HCHG SURGERY MINUTES - 1ST 30 MINS LEVEL 4: Performed by: SURGERY

## 2020-07-23 PROCEDURE — 160046 HCHG PACU - 1ST 60 MINS PHASE II: Performed by: SURGERY

## 2020-07-23 PROCEDURE — 700101 HCHG RX REV CODE 250: Performed by: SURGERY

## 2020-07-23 PROCEDURE — 700102 HCHG RX REV CODE 250 W/ 637 OVERRIDE(OP): Performed by: ANESTHESIOLOGY

## 2020-07-23 PROCEDURE — 700102 HCHG RX REV CODE 250 W/ 637 OVERRIDE(OP)

## 2020-07-23 PROCEDURE — 160035 HCHG PACU - 1ST 60 MINS PHASE I: Performed by: SURGERY

## 2020-07-23 PROCEDURE — 700101 HCHG RX REV CODE 250: Performed by: ANESTHESIOLOGY

## 2020-07-23 PROCEDURE — 501570 HCHG TROCAR, SEPARATOR: Performed by: SURGERY

## 2020-07-23 PROCEDURE — C1781 MESH (IMPLANTABLE): HCPCS | Performed by: SURGERY

## 2020-07-23 PROCEDURE — 160047 HCHG PACU  - EA ADDL 30 MINS PHASE II: Performed by: SURGERY

## 2020-07-23 DEVICE — MESH PROGRIP LAPROSCOPIC SELF FIXATING (1/CA): Type: IMPLANTABLE DEVICE | Status: FUNCTIONAL

## 2020-07-23 RX ORDER — HYDROMORPHONE HYDROCHLORIDE 1 MG/ML
0.1 INJECTION, SOLUTION INTRAMUSCULAR; INTRAVENOUS; SUBCUTANEOUS
Status: DISCONTINUED | OUTPATIENT
Start: 2020-07-23 | End: 2020-07-23 | Stop reason: HOSPADM

## 2020-07-23 RX ORDER — MIDAZOLAM HYDROCHLORIDE 1 MG/ML
1 INJECTION INTRAMUSCULAR; INTRAVENOUS
Status: DISCONTINUED | OUTPATIENT
Start: 2020-07-23 | End: 2020-07-23 | Stop reason: HOSPADM

## 2020-07-23 RX ORDER — SODIUM CHLORIDE, SODIUM LACTATE, POTASSIUM CHLORIDE, CALCIUM CHLORIDE 600; 310; 30; 20 MG/100ML; MG/100ML; MG/100ML; MG/100ML
INJECTION, SOLUTION INTRAVENOUS CONTINUOUS
Status: DISCONTINUED | OUTPATIENT
Start: 2020-07-23 | End: 2020-07-23 | Stop reason: HOSPADM

## 2020-07-23 RX ORDER — GABAPENTIN 300 MG/1
300 CAPSULE ORAL 3 TIMES DAILY
Qty: 18 CAP | Refills: 0 | Status: SHIPPED | OUTPATIENT
Start: 2020-07-23 | End: 2020-07-29

## 2020-07-23 RX ORDER — ROCURONIUM BROMIDE 10 MG/ML
INJECTION, SOLUTION INTRAVENOUS PRN
Status: DISCONTINUED | OUTPATIENT
Start: 2020-07-23 | End: 2020-07-23 | Stop reason: SURG

## 2020-07-23 RX ORDER — CELECOXIB 200 MG/1
200 CAPSULE ORAL 2 TIMES DAILY
Qty: 10 CAP | Refills: 0 | Status: SHIPPED | OUTPATIENT
Start: 2020-07-23 | End: 2020-07-28

## 2020-07-23 RX ORDER — OXYCODONE HCL 5 MG/5 ML
5 SOLUTION, ORAL ORAL
Status: COMPLETED | OUTPATIENT
Start: 2020-07-23 | End: 2020-07-23

## 2020-07-23 RX ORDER — DIPHENHYDRAMINE HYDROCHLORIDE 50 MG/ML
12.5 INJECTION INTRAMUSCULAR; INTRAVENOUS
Status: DISCONTINUED | OUTPATIENT
Start: 2020-07-23 | End: 2020-07-23 | Stop reason: HOSPADM

## 2020-07-23 RX ORDER — CEFAZOLIN SODIUM 1 G/3ML
INJECTION, POWDER, FOR SOLUTION INTRAMUSCULAR; INTRAVENOUS PRN
Status: DISCONTINUED | OUTPATIENT
Start: 2020-07-23 | End: 2020-07-23 | Stop reason: SURG

## 2020-07-23 RX ORDER — HALOPERIDOL 5 MG/ML
1 INJECTION INTRAMUSCULAR
Status: DISCONTINUED | OUTPATIENT
Start: 2020-07-23 | End: 2020-07-23 | Stop reason: HOSPADM

## 2020-07-23 RX ORDER — HYDRALAZINE HYDROCHLORIDE 20 MG/ML
5 INJECTION INTRAMUSCULAR; INTRAVENOUS
Status: DISCONTINUED | OUTPATIENT
Start: 2020-07-23 | End: 2020-07-23 | Stop reason: HOSPADM

## 2020-07-23 RX ORDER — LIDOCAINE HYDROCHLORIDE 20 MG/ML
INJECTION, SOLUTION EPIDURAL; INFILTRATION; INTRACAUDAL; PERINEURAL PRN
Status: DISCONTINUED | OUTPATIENT
Start: 2020-07-23 | End: 2020-07-23 | Stop reason: SURG

## 2020-07-23 RX ORDER — BUPIVACAINE HYDROCHLORIDE AND EPINEPHRINE 5; 5 MG/ML; UG/ML
INJECTION, SOLUTION EPIDURAL; INTRACAUDAL; PERINEURAL
Status: DISCONTINUED | OUTPATIENT
Start: 2020-07-23 | End: 2020-07-23 | Stop reason: HOSPADM

## 2020-07-23 RX ORDER — NEOSTIGMINE METHYLSULFATE 1 MG/ML
INJECTION, SOLUTION INTRAVENOUS PRN
Status: DISCONTINUED | OUTPATIENT
Start: 2020-07-23 | End: 2020-07-23 | Stop reason: SURG

## 2020-07-23 RX ORDER — HYDROMORPHONE HYDROCHLORIDE 1 MG/ML
0.4 INJECTION, SOLUTION INTRAMUSCULAR; INTRAVENOUS; SUBCUTANEOUS
Status: DISCONTINUED | OUTPATIENT
Start: 2020-07-23 | End: 2020-07-23 | Stop reason: HOSPADM

## 2020-07-23 RX ORDER — ACETAMINOPHEN 325 MG/1
650 TABLET ORAL EVERY 4 HOURS PRN
COMMUNITY
End: 2022-01-28

## 2020-07-23 RX ORDER — HYDROMORPHONE HYDROCHLORIDE 1 MG/ML
0.2 INJECTION, SOLUTION INTRAMUSCULAR; INTRAVENOUS; SUBCUTANEOUS
Status: DISCONTINUED | OUTPATIENT
Start: 2020-07-23 | End: 2020-07-23 | Stop reason: HOSPADM

## 2020-07-23 RX ORDER — OXYCODONE HCL 5 MG/5 ML
10 SOLUTION, ORAL ORAL
Status: COMPLETED | OUTPATIENT
Start: 2020-07-23 | End: 2020-07-23

## 2020-07-23 RX ORDER — IPRATROPIUM BROMIDE AND ALBUTEROL SULFATE 2.5; .5 MG/3ML; MG/3ML
3 SOLUTION RESPIRATORY (INHALATION)
Status: DISCONTINUED | OUTPATIENT
Start: 2020-07-23 | End: 2020-07-23 | Stop reason: HOSPADM

## 2020-07-23 RX ORDER — KETOROLAC TROMETHAMINE 30 MG/ML
INJECTION, SOLUTION INTRAMUSCULAR; INTRAVENOUS PRN
Status: DISCONTINUED | OUTPATIENT
Start: 2020-07-23 | End: 2020-07-23 | Stop reason: SURG

## 2020-07-23 RX ORDER — GLYCOPYRROLATE 0.2 MG/ML
INJECTION INTRAMUSCULAR; INTRAVENOUS PRN
Status: DISCONTINUED | OUTPATIENT
Start: 2020-07-23 | End: 2020-07-23 | Stop reason: SURG

## 2020-07-23 RX ORDER — ONDANSETRON 2 MG/ML
4 INJECTION INTRAMUSCULAR; INTRAVENOUS
Status: DISCONTINUED | OUTPATIENT
Start: 2020-07-23 | End: 2020-07-23 | Stop reason: HOSPADM

## 2020-07-23 RX ORDER — LABETALOL HYDROCHLORIDE 5 MG/ML
5 INJECTION, SOLUTION INTRAVENOUS
Status: DISCONTINUED | OUTPATIENT
Start: 2020-07-23 | End: 2020-07-23 | Stop reason: HOSPADM

## 2020-07-23 RX ORDER — OXYCODONE HYDROCHLORIDE 5 MG/1
5 TABLET ORAL EVERY 4 HOURS PRN
Qty: 10 TAB | Refills: 0 | Status: SHIPPED | OUTPATIENT
Start: 2020-07-23 | End: 2020-07-28

## 2020-07-23 RX ORDER — MEPERIDINE HYDROCHLORIDE 25 MG/ML
12.5 INJECTION INTRAMUSCULAR; INTRAVENOUS; SUBCUTANEOUS
Status: DISCONTINUED | OUTPATIENT
Start: 2020-07-23 | End: 2020-07-23 | Stop reason: HOSPADM

## 2020-07-23 RX ADMIN — SODIUM CHLORIDE, POTASSIUM CHLORIDE, SODIUM LACTATE AND CALCIUM CHLORIDE: 600; 310; 30; 20 INJECTION, SOLUTION INTRAVENOUS at 12:57

## 2020-07-23 RX ADMIN — FENTANYL CITRATE 150 MCG: 50 INJECTION, SOLUTION INTRAMUSCULAR; INTRAVENOUS at 11:48

## 2020-07-23 RX ADMIN — KETOROLAC TROMETHAMINE 30 MG: 30 INJECTION, SOLUTION INTRAMUSCULAR at 13:23

## 2020-07-23 RX ADMIN — ROCURONIUM BROMIDE 50 MG: 10 INJECTION, SOLUTION INTRAVENOUS at 11:25

## 2020-07-23 RX ADMIN — SODIUM CHLORIDE, POTASSIUM CHLORIDE, SODIUM LACTATE AND CALCIUM CHLORIDE: 600; 310; 30; 20 INJECTION, SOLUTION INTRAVENOUS at 10:02

## 2020-07-23 RX ADMIN — ROCURONIUM BROMIDE 20 MG: 10 INJECTION, SOLUTION INTRAVENOUS at 12:35

## 2020-07-23 RX ADMIN — FENTANYL CITRATE 100 MCG: 50 INJECTION, SOLUTION INTRAMUSCULAR; INTRAVENOUS at 11:25

## 2020-07-23 RX ADMIN — FENTANYL CITRATE 250 MCG: 50 INJECTION, SOLUTION INTRAMUSCULAR; INTRAVENOUS at 12:36

## 2020-07-23 RX ADMIN — LIDOCAINE HYDROCHLORIDE 0.5 ML: 10 INJECTION, SOLUTION EPIDURAL; INFILTRATION; INTRACAUDAL at 10:01

## 2020-07-23 RX ADMIN — FENTANYL CITRATE 25 MCG: 50 INJECTION INTRAMUSCULAR; INTRAVENOUS at 14:23

## 2020-07-23 RX ADMIN — FENTANYL CITRATE 25 MCG: 50 INJECTION INTRAMUSCULAR; INTRAVENOUS at 14:05

## 2020-07-23 RX ADMIN — PROPOFOL 200 MG: 10 INJECTION, EMULSION INTRAVENOUS at 11:25

## 2020-07-23 RX ADMIN — LIDOCAINE HYDROCHLORIDE 100 MG: 20 INJECTION, SOLUTION EPIDURAL; INFILTRATION; INTRACAUDAL at 11:25

## 2020-07-23 RX ADMIN — CEFAZOLIN 2 G: 330 INJECTION, POWDER, FOR SOLUTION INTRAMUSCULAR; INTRAVENOUS at 11:25

## 2020-07-23 RX ADMIN — POVIDONE-IODINE 15 ML: 10 SOLUTION TOPICAL at 10:01

## 2020-07-23 RX ADMIN — FENTANYL CITRATE 50 MCG: 50 INJECTION INTRAMUSCULAR; INTRAVENOUS at 13:53

## 2020-07-23 RX ADMIN — GLYCOPYRROLATE 0.4 MG: 0.2 INJECTION INTRAMUSCULAR; INTRAVENOUS at 13:29

## 2020-07-23 RX ADMIN — NEOSTIGMINE METHYLSULFATE 3 MG: 1 INJECTION INTRAVENOUS at 13:29

## 2020-07-23 RX ADMIN — OXYCODONE HYDROCHLORIDE 10 MG: 5 SOLUTION ORAL at 13:52

## 2020-07-23 NOTE — ANESTHESIA PROCEDURE NOTES
Airway    Date/Time: 7/23/2020 11:26 AM  Performed by: Goldy Dumas M.D.  Authorized by: Goldy Dumas M.D.     Location:  OR  Urgency:  Elective  Difficult Airway: No    Indications for Airway Management:  Anesthesia      Spontaneous Ventilation: absent    Sedation Level:  Deep  Preoxygenated: Yes    Patient Position:  Sniffing  Mask Difficulty Assessment:  1 - vent by mask  Final Airway Type:  Endotracheal airway  Final Endotracheal Airway:  ETT  Cuffed: Yes    Technique Used for Successful ETT Placement:  Direct laryngoscopy    Insertion Site:  Oral  Blade Type:  Crocker  Laryngoscope Blade/Videolaryngoscope Blade Size:  3  ETT Size (mm):  8.0  Measured from:  Teeth  ETT to Teeth (cm):  24  Placement Verified by: auscultation and capnometry    Cormack-Lehane Classification:  Grade I - full view of glottis  Number of Attempts at Approach:  1

## 2020-07-23 NOTE — OR NURSING
Patient received from OR at 1345, bedside report received from anesthesia/OR RN, 2 patient identifiers confirmed . Patient connected to monitors, assessed for general head to toe and pain/nausea. Ordered reviewed and checked. Wife updated via telephone on patient status x 1.  At this time patient meets criteria for D/C to phase II/room. VSS, awake and appropriate, pain minimal or at a tolerable level per patient. Report called to Lola BAY and patient taken over to phase II.

## 2020-07-23 NOTE — OR NURSING
Arrived to Phase II after report from PACU RN     VSS, denies nausea, reports pain 4/10, tolerable per pt. Ambulated from gurney to chair with standby assist.    Surgical site clean and dry. No drainage.     Alarms on and set to appropriate parameters. Call light within reach, rounding in place. Belongings given to pt.

## 2020-07-23 NOTE — OR NURSING
Pt's VSS; denies N/V; states pain is at tolerable level. Lap sites clean and dry. D/c orders received. IV dc'd. Pt changed into clothing on own. Pt up and ambulated to BR, steady gait, voided adequately. Discharge instructions given as well as pain management handout; pt verbalized understanding and questions answered. Patient states ready to d/c home.Rx for pain meds given to pt. Pt dc'd in w/c with CNA in stable condition.

## 2020-07-23 NOTE — DISCHARGE INSTRUCTIONS
Laparoscopic Surgery Discharge Instructions:    1. DIET: Upon discharge from the hospital you may resume your normal preoperative diet. Depending on how you are feeling and whether you have nausea or not, you may wish to stay with a bland diet for the first few days. However, you can advance your diet as quickly as you feel ready.    2. ACTIVITIES: You have a ten pound weight lifting restriction for four to six weeks after surgery.  This means no lifting anything heavier than a gallon of milk.  Routine activities such as walking and using the stairs are safe.  You may sleep in any position that is comfortable. Avoid strenuous activities and exercise that involves twisting, bending, and, running.    3. DRIVING: You may drive whenever you are off pain medications and are able to perform the activities needed to drive, i.e. turning, bending, twisting, wearing a seat belt, etc.    4. BATHING: You may shower two days following your surgery, but do not submerge in a bath or a pool until you after your first postoperative visit.    5. BOWEL FUNCTION: You may develop either frequent or loose stools after meals. This usually corrects itself after a few days, to a few weeks.    You may develop constipation. The combination of pain medication and decreased activity level can cause constipation in otherwise normal patients. If you feel this is occurring, increase your fluid intake and take an over the counter laxative (Milk of Magnesia, Miralax, or Magnesium Citrate).  If this is not successful, take an over the counter Fleet enema and repeat the laxative until effect.    6. PAIN MEDICATION: You may be given a prescription for pain medication at discharge. Please take these as directed. It is important to remember not to take medications on an empty stomach as this may cause nausea.  Wean the use of pain medication as soon as possible.  If narcotics were prescribed, try to avoid their use and try non-narcotic medications, such  as tylenol first.    7. CALL IF YOU HAVE: (1) Fevers of more than 101 F (38.5 C), (2) New chest or leg pain, (3) Worsening abdominal pain, (4) Persistent vomiting, (5) Large quantity bleeding, (6) Drainage from incision that is foul smelling, increased pain at the wound, wound edges that have pulled apart, redness or swelling at the incision site. Please do not hesitate to call with any other questions.     8. FOLLOW-UP APPOINTMENT: Contact my office at [phone] for a follow-up appointment in 1 to 2 weeks following your procedure.    If you have any additional questions, please do not hesitate to call the office and speak to either myself or the physician on call.    Office address:  [address] [place], [place] [address]e [facility], [address] [place] [address]      Radha Cruz M.D.   [facility]  General Surgery  Colon and Rectal Surgeon      ACTIVITY: Rest and take it easy for the first 24 hours.  A responsible adult is recommended to remain with you during that time.  It is normal to feel sleepy.  We encourage you to not do anything that requires balance, judgment or coordination.    MILD FLU-LIKE SYMPTOMS ARE NORMAL. YOU MAY EXPERIENCE GENERALIZED MUSCLE ACHES, THROAT IRRITATION, HEADACHE AND/OR SOME NAUSEA.    FOR 24 HOURS DO NOT:  Drive, operate machinery or run household appliances.  Drink beer or alcoholic beverages.   Make important decisions or sign legal documents.    SPECIAL INSTRUCTIONS: See instructions from Dr. Cruz     DIET: To avoid nausea, slowly advance diet as tolerated, avoiding spicy or greasy foods for the first day.  Add more substantial food to your diet according to your physician's instructions.  Babies can be fed formula or breast milk as soon as they are hungry.  INCREASE FLUIDS AND FIBER TO AVOID CONSTIPATION.    SURGICAL DRESSING/BATHING: You may shower two days following your surgery, but do not submerge in a bath or a pool until you after your first postoperative visit.      FOLLOW-UP  APPOINTMENT:  A follow-up appointment should be arranged with your doctor in 1-2 weeks; call to schedule.    You should CALL YOUR PHYSICIAN if you develop:  Fever greater than 101 degrees F.  Pain not relieved by medication, or persistent nausea or vomiting.  Excessive bleeding (blood soaking through dressing) or unexpected drainage from the wound.  Extreme redness or swelling around the incision site, drainage of pus or foul smelling drainage.  Inability to urinate or empty your bladder within 8 hours.  Problems with breathing or chest pain.    You should call 911 if you develop problems with breathing or chest pain.  If you are unable to contact your doctor or surgical center, you should go to the nearest emergency room or urgent care center.  Physician's telephone #: [phone]    If any questions arise, call your doctor.  If your doctor is not available, please feel free to call the Surgical Center at [phone].  The Center is open Monday through Friday from 7AM to 7PM.  You can also call the [employer] HOTLINE open 24 hours/day, 7 days/week and speak to a nurse at [phone], or toll free at [phone].    A registered nurse may call you a few days after your surgery to see how you are doing after your procedure.    MEDICATIONS: Resume taking daily medication.  Take prescribed pain medication with food.  If no medication is prescribed, you may take non-aspirin pain medication if needed.  PAIN MEDICATION CAN BE VERY CONSTIPATING.  Take a stool softener or laxative such as senokot, pericolace, or milk of magnesia if needed.    Prescription given for Oxycodone.  Last pain medication given at 1:52 PM 10mg Oxycodone.     If your physician has prescribed pain medication that includes Acetaminophen (Tylenol), do not take additional Acetaminophen (Tylenol) while taking the prescribed medication.    Depression / Suicide Risk    As you are discharged from this Critical access hospital facility, it is important to learn how to  keep safe from harming yourself.    Recognize the warning signs:  · Abrupt changes in personality, positive or negative- including increase in energy   · Giving away possessions  · Change in eating patterns- significant weight changes-  positive or negative  · Change in sleeping patterns- unable to sleep or sleeping all the time   · Unwillingness or inability to communicate  · Depression  · Unusual sadness, discouragement and loneliness  · Talk of wanting to die  · Neglect of personal appearance   · Rebelliousness- reckless behavior  · Withdrawal from people/activities they love  · Confusion- inability to concentrate     If you or a loved one observes any of these behaviors or has concerns about self-harm, here's what you can do:  · Talk about it- your feelings and reasons for harming yourself  · Remove any means that you might use to hurt yourself (examples: pills, rope, extension cords, firearm)  · Get professional help from the community (Mental Health, Substance Abuse, psychological counseling)  · Do not be alone:Call your Safe Contact- someone whom you trust who will be there for you.  · Call your local CRISIS HOTLINE 793-4890 or 711-078-8689  · Call your local Children's Mobile Crisis Response Team [place] (046) 693-7180 or www.[employer]  · Call the toll free National Suicide Prevention Hotlines   · National Suicide Prevention Lifeline 809-975-QHWN (2422)  · National Hope Line Network 800-SUICIDE (450-5750)

## 2020-07-23 NOTE — OP REPORT
Operative Report    Date: 7/23/2020    Surgeon: Radha Cruz M.D.     Assistant: Alexander Greer MD    Pre-operative Diagnosis: Bilateral Inguinal Hernias    Post-operative Diagnosis:   Large right direct inguinal hernia  Small left indirect and direct inguinal hernias    Procedure: Robotic bilateral Inguinal Hernia Repair with Mesh    Indications: This is a 44 y.o. male who presented with symptoms of right and left Inguinal Hernias with the larger on the right. Here for repair.    Findings:   Large right direct inguinal hernia  Small left indirect and direct inguinal hernias    Wound Classification: Class I, I, Clean..    Procedure in detail: The patient was seen and examined in the preoperative holding area and both groins were marked with the patient's input to identify the planned bilateral repair.  The risks benefits and alternatives of the procedure were discussed with the patient who wished to proceed with the procedure as described.  The patient was transferred to the operating room placed in supine position and all pressure points were properly padded.  General endotracheal anesthesia was induced and preoperative antibiotics were given per SCIP protocol.  Patient's abdomen was prepped with ChloraPrep and draped in the normal sterile fashion.  A timeout was performed confirming correct patient, correct procedure, and that all necessary equipment was in the room.      We began the procedure by performing a periumbilical Optiview approach.  The area for our camera port was identified and infused with local anesthetic, local anesthetic was subsequently infused over all port sites.  The skin was sharply incised and the 5 mm port was used to gain access to the abdomen.  Pneumoperitoneum was then achieved and maintained at 15 mmHg carbon dioxide throughout the entirety of the case.  Under direct visualization a right and left working port were placed with the 8 mm robotic port.  We then exchanged our 5 mm port  for an 8 mm robotic port. the robot was then docked.  We began by identifying an area approximately 8 cm from the inguinal hernia and the peritoneum was sharply incised.  Using combination of blunt, electrocautery, and sharp dissection we were able to dissect the peritoneal flap down to the inguinal canal.  Careful dissection was completed medially to identify the pubic symphysis and carried this down to the obturator canal.  The corona mortise was identified and preserved.  We then continued dissection laterally on the left until we encountered the psoas muscle ensuring that we maintain the iliopubic tract against the abdominal wall. A pantaloon hernia defect was identified on the left. Careful dissection was used to free the indirect inguinal hernia sac ensuring that the gonadal structures were carefully identified and preserved throughout the dissection of the hernia sac.  We carried our dissection down until the peritoneum was well below our area of mesh placement.  A small hole was made in the peritoneum during the dissection.    The same procedure was used on the right side and a large direct inguinal hernia was identified there.  A tiny hole was also made in the peritoneum on the right.    Two 15 x 10 pro- hernia meshs was selected and introduced.  These were then laid into the dissected peritoneal flap ensuring good coverage medially down the pubic symphysis over the midline covering the  space as well as good overlap over the inguinal canal anatomy.  The peritoneal flap was manipulated to ensure that the mesh did not move.    The peritoneal flap was then closed with a 2-0 strata fix suture.  The perineal flap was then carefully inspected and the previously mentioned defects were closed carefully using the same 2-0 strata fix suture on the left and 3-0 Vicryl in the right.  The robot was then undocked and the ports were carefully removed.  Skin was then closed with 4-0 Monocryl in a  sub-particular fashion and Dermabond was placed over the wounds.    The patient was awakened from general anesthetic, and was taken to the recovery room in stable condition.    Sponge and needle counts were correct at the end of the case.     Specimen: none    EBL: 5cc    Dispo: stable, extubated, to PACU    Radha Cruz M.D.  [facility]  [phone]

## 2020-07-23 NOTE — ANESTHESIA POSTPROCEDURE EVALUATION
Patient: Joaquin Donaldson    Procedure Summary     Date:  07/23/20 Room / Location:  [facility] OR 11 / SURGERY [facility]    Anesthesia Start:  1122 Anesthesia Stop:  1347    Procedure:  REPAIR, HERNIA, INGUINAL, ROBOT-ASSISTED, USING DA ADALGISA XI-WITH MESH (Bilateral Abdomen) Diagnosis:  (bilateral inguinal hernias)    Surgeon:  Radha Cruz M.D. Responsible Provider:  Goldy Dumas M.D.    Anesthesia Type:  general ASA Status:  2          Final Anesthesia Type: general  Last vitals  BP   Blood Pressure: 122/65    Temp   36.4 °C (97.5 °F)    Pulse   Pulse: 83   Resp   16    SpO2   100 %      Anesthesia Post Evaluation    Patient location during evaluation: PACU  Patient participation: complete - patient participated  Level of consciousness: awake and alert    Airway patency: patent  Anesthetic complications: no  Cardiovascular status: hemodynamically stable  Respiratory status: acceptable  Hydration status: euvolemic    PONV: none

## 2020-07-23 NOTE — ANESTHESIA TIME REPORT
Anesthesia Start and Stop Event Times     Date Time Event    7/23/2020 1114 Ready for Procedure     1122 Anesthesia Start     1347 Anesthesia Stop        Responsible Staff  07/23/20    Name Role Begin End    Goldy Dumas M.D. Anesth 1122 1347        Preop Diagnosis (Free Text):  Pre-op Diagnosis     RECURRENT RIGHT INGUINAL HERNIA        Preop Diagnosis (Codes):    Post op Diagnosis  Recurrent right inguinal hernia      Premium Reason  Non-Premium    Comments:

## 2020-08-20 ENCOUNTER — HOSPITAL ENCOUNTER (OUTPATIENT)
Dept: RADIOLOGY | Facility: MEDICAL CENTER | Age: 45
End: 2020-08-20
Attending: FAMILY MEDICINE
Payer: COMMERCIAL

## 2020-08-20 ENCOUNTER — OFFICE VISIT (OUTPATIENT)
Dept: URGENT CARE | Facility: PHYSICIAN GROUP | Age: 45
End: 2020-08-20
Payer: COMMERCIAL

## 2020-08-20 ENCOUNTER — APPOINTMENT (OUTPATIENT)
Dept: RADIOLOGY | Facility: IMAGING CENTER | Age: 45
End: 2020-08-20
Attending: FAMILY MEDICINE
Payer: COMMERCIAL

## 2020-08-20 VITALS
HEART RATE: 69 BPM | RESPIRATION RATE: 16 BRPM | OXYGEN SATURATION: 96 % | HEIGHT: 73 IN | SYSTOLIC BLOOD PRESSURE: 100 MMHG | DIASTOLIC BLOOD PRESSURE: 78 MMHG | BODY MASS INDEX: 27.3 KG/M2 | WEIGHT: 206 LBS | TEMPERATURE: 98.6 F

## 2020-08-20 DIAGNOSIS — S39.011A STRAIN OF ABDOMINAL MUSCLE, INITIAL ENCOUNTER: ICD-10-CM

## 2020-08-20 DIAGNOSIS — S16.1XXA STRAIN OF NECK MUSCLE, INITIAL ENCOUNTER: ICD-10-CM

## 2020-08-20 DIAGNOSIS — R10.9 ABDOMINAL PAIN, UNSPECIFIED ABDOMINAL LOCATION: ICD-10-CM
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
## 2020-08-20 PROCEDURE — 99214 OFFICE O/P EST MOD 30 MIN: CPT | Performed by: FAMILY MEDICINE

## 2020-08-20 PROCEDURE — 74019 RADEX ABDOMEN 2 VIEWS: CPT

## 2020-08-20 RX ORDER — CYCLOBENZAPRINE HCL 10 MG
10 TABLET ORAL 3 TIMES DAILY PRN
Qty: 30 TAB | Refills: 0 | Status: SHIPPED | OUTPATIENT
Start: 2020-08-20 | End: 2020-11-08
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
## 2020-11-08 ENCOUNTER — OFFICE VISIT (OUTPATIENT)
Dept: URGENT CARE | Facility: PHYSICIAN GROUP | Age: 45
End: 2020-11-08
Payer: COMMERCIAL

## 2020-11-08 VITALS
HEIGHT: 73 IN | OXYGEN SATURATION: 97 % | SYSTOLIC BLOOD PRESSURE: 122 MMHG | HEART RATE: 64 BPM | RESPIRATION RATE: 12 BRPM | TEMPERATURE: 98 F | BODY MASS INDEX: 25.18 KG/M2 | DIASTOLIC BLOOD PRESSURE: 76 MMHG | WEIGHT: 190 LBS

## 2020-11-08 DIAGNOSIS — M54.9 UPPER BACK PAIN: ICD-10-CM

## 2020-11-08 DIAGNOSIS — M62.830 SPASM OF THORACOLUMBAR MUSCLE: ICD-10-CM

## 2020-11-08 PROCEDURE — 99214 OFFICE O/P EST MOD 30 MIN: CPT | Performed by: NURSE PRACTITIONER

## 2020-11-08 RX ORDER — CYCLOBENZAPRINE HCL 10 MG
10 TABLET ORAL 3 TIMES DAILY PRN
Qty: 20 TAB | Refills: 0 | Status: SHIPPED | OUTPATIENT
Start: 2020-11-08 | End: 2021-05-06

## 2020-11-08 RX ORDER — OMEPRAZOLE 20 MG/1
CAPSULE, DELAYED RELEASE ORAL
COMMUNITY
Start: 2020-11-07 | End: 2022-01-28

## 2020-11-08 RX ORDER — TADALAFIL 10 MG/1
TABLET ORAL
COMMUNITY
Start: 2020-09-15 | End: 2022-01-28

## 2020-11-08 RX ORDER — FLUOXETINE HYDROCHLORIDE 40 MG/1
40 CAPSULE ORAL
COMMUNITY
Start: 2020-10-02

## 2020-11-08 RX ORDER — METHYLPREDNISOLONE 4 MG/1
TABLET ORAL
Qty: 21 TAB | Refills: 0 | Status: SHIPPED | OUTPATIENT
Start: 2020-11-08 | End: 2021-05-06

## 2020-11-08 ASSESSMENT — ENCOUNTER SYMPTOMS
NUMBNESS: 0
TROUBLE SWALLOWING: 0
TINGLING: 0
PARESIS: 0
HEADACHES: 0
WEAKNESS: 0
NECK PAIN: 1

## 2020-11-08 NOTE — PROGRESS NOTES
Subjective:      Joaquin Donaldson is a 45 y.o. male who presents with Neck Pain (right sided neck and shoulder pain x2 days )            Neck Pain   This is a new problem. Episode onset: pt reports 2 days ago he lifted his right arm and felt a pop/pinch near his right shoulder between the shoulder blade and his spine. He states it feels tight and tender over the area when palpated. Pain radiates to lower neck and right shoulder. The problem occurs intermittently. The problem has been unchanged. The pain is present in the right side. The quality of the pain is described as cramping and shooting. The symptoms are aggravated by position. The pain is same all the time. Pertinent negatives include no headaches, numbness, paresis, tingling, trouble swallowing or weakness. He has tried ice and acetaminophen (reports he cannot take NSAIDs right now because he is scheduled for an endoscopy within the next week) for the symptoms. The treatment provided no relief.       Review of Systems   HENT: Negative for trouble swallowing.    Musculoskeletal: Positive for neck pain.   Neurological: Negative for tingling, weakness, numbness and headaches.   All other systems reviewed and are negative.    Past Medical History:   Diagnosis Date   • Bronchitis 02/2020   • Psychiatric problem     depression and anxiety      Past Surgical History:   Procedure Laterality Date   • INGUINAL HERNIA REPAIR ROBOTIC XI Bilateral 7/23/2020    Procedure: REPAIR, HERNIA, INGUINAL, ROBOT-ASSISTED, USING DA ADALGISA XI-WITH MESH;  Surgeon: Radha Cruz M.D.;  Location: SURGERY [facility];  Service: Gen Robotic   • HERNIA REPAIR  1995   • KNEE ARTHROSCOPY Right       Social History     Socioeconomic History   • Marital status:      Spouse name: Not on file   • Number of children: Not on file   • Years of education: Not on file   • Highest education level: Not on file   Occupational History   • Not on file   Social Needs   • Financial  "resource strain: Not on file   • Food insecurity     Worry: Not on file     Inability: Not on file   • Transportation needs     Medical: Not on file     Non-medical: Not on file   Tobacco Use   • Smoking status: Former Smoker     Packs/day: 0.25     Types: Cigarettes   • Smokeless tobacco: Never Used   Substance and Sexual Activity   • Alcohol use: Yes     Frequency: 4 or more times a week     Drinks per session: 1 or 2   • Drug use: Not Currently     Comment: marijuana   • Sexual activity: Not on file   Lifestyle   • Physical activity     Days per week: Not on file     Minutes per session: Not on file   • Stress: Not on file   Relationships   • Social connections     Talks on phone: Not on file     Gets together: Not on file     Attends Mandaeism service: Not on file     Active member of club or organization: Not on file     Attends meetings of clubs or organizations: Not on file     Relationship status: Not on file   • Intimate partner violence     Fear of current or ex partner: Not on file     Emotionally abused: Not on file     Physically abused: Not on file     Forced sexual activity: Not on file   Other Topics Concern   • Not on file   Social History Narrative   • Not on file          Objective:     /76 (BP Location: Right arm, Patient Position: Sitting, BP Cuff Size: Adult)   Pulse 64   Temp 36.7 °C (98 °F)   Resp 12   Ht 1.854 m (6' 1\")   Wt 86.2 kg (190 lb)   SpO2 97%   BMI 25.07 kg/m²      Physical Exam  Vitals signs and nursing note reviewed.   Constitutional:       Appearance: Normal appearance.   HENT:      Head: Normocephalic and atraumatic.      Nose: Nose normal.   Eyes:      Extraocular Movements: Extraocular movements intact.      Pupils: Pupils are equal, round, and reactive to light.   Neck:      Musculoskeletal: Normal range of motion.   Cardiovascular:      Rate and Rhythm: Normal rate.   Pulmonary:      Effort: Pulmonary effort is normal.   Musculoskeletal: Normal range of motion. "      Thoracic back: He exhibits tenderness, pain and spasm.        Back:    Skin:     General: Skin is warm and dry.      Capillary Refill: Capillary refill takes less than 2 seconds.   Neurological:      General: No focal deficit present.      Mental Status: He is alert and oriented to person, place, and time. Mental status is at baseline.   Psychiatric:         Mood and Affect: Mood normal.         Speech: Speech normal.         Thought Content: Thought content normal.         Judgment: Judgment normal.                 Assessment/Plan:        1. Upper back pain    2. Spasm of thoracolumbar muscle  - methylPREDNISolone (MEDROL DOSEPAK) 4 MG Tablet Therapy Pack; Follow schedule on package instructions.  Dispense: 21 Tab; Refill: 0  - cyclobenzaprine (FLEXERIL) 10 mg Tab; Take 1 Tab by mouth 3 times a day as needed for Muscle Spasms (do not take while driving or working).  Dispense: 20 Tab; Refill: 0    Discussed sedation effects of flexeril  May take tylenol as needed for additional pain relief  Encouraged ice and heat compresses, alternating  Gentle ROM exercises  No heavy lifting  Supportive care, differential diagnoses, and indications for immediate follow-up discussed with patient.    Pathogenesis of diagnosis discussed including typical length and natural progression.      Instructed to return to  or nearest emergency department if symptoms fail to improve, for any change in condition, further concerns, or new concerning symptoms.  Patient states understanding of the plan of care and discharge instructions.

## 2021-05-05 ENCOUNTER — APPOINTMENT (OUTPATIENT)
Dept: RADIOLOGY | Facility: MEDICAL CENTER | Age: 46
End: 2021-05-05
Attending: EMERGENCY MEDICINE
Payer: COMMERCIAL

## 2021-05-05 ENCOUNTER — HOSPITAL ENCOUNTER (EMERGENCY)
Facility: MEDICAL CENTER | Age: 46
End: 2021-05-06
Attending: EMERGENCY MEDICINE
Payer: COMMERCIAL

## 2021-05-05 DIAGNOSIS — S20.211A CONTUSION OF RIGHT CHEST WALL, INITIAL ENCOUNTER: ICD-10-CM

## 2021-05-05 DIAGNOSIS — T07.XXXA MULTIPLE ABRASIONS: ICD-10-CM
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
## 2021-05-05 DIAGNOSIS — S99.921A INJURY OF RIGHT FOOT, INITIAL ENCOUNTER: ICD-10-CM

## 2021-05-05 DIAGNOSIS — S09.90XA CLOSED HEAD INJURY, INITIAL ENCOUNTER: ICD-10-CM

## 2021-05-05 DIAGNOSIS — S13.9XXA ACUTE NECK SPRAIN, INITIAL ENCOUNTER: ICD-10-CM

## 2021-05-05 DIAGNOSIS — S39.012A LUMBAR STRAIN, INITIAL ENCOUNTER: ICD-10-CM

## 2021-05-05 PROCEDURE — 99285 EMERGENCY DEPT VISIT HI MDM: CPT

## 2021-05-05 PROCEDURE — 96374 THER/PROPH/DIAG INJ IV PUSH: CPT

## 2021-05-05 PROCEDURE — 73630 X-RAY EXAM OF FOOT: CPT | Mod: RT

## 2021-05-06 ENCOUNTER — HOSPITAL ENCOUNTER (OUTPATIENT)
Dept: RADIOLOGY | Facility: MEDICAL CENTER | Age: 46
End: 2021-05-06
Attending: EMERGENCY MEDICINE

## 2021-05-06 VITALS
SYSTOLIC BLOOD PRESSURE: 147 MMHG | DIASTOLIC BLOOD PRESSURE: 75 MMHG | WEIGHT: 191.8 LBS | OXYGEN SATURATION: 96 % | RESPIRATION RATE: 19 BRPM | HEART RATE: 58 BPM | HEIGHT: 73 IN | BODY MASS INDEX: 25.42 KG/M2 | TEMPERATURE: 98.4 F

## 2021-05-06 LAB
ALBUMIN SERPL BCP-MCNC: 4.2 G/DL (ref 3.2–4.9)
ALBUMIN/GLOB SERPL: 1.8 G/DL
ALP SERPL-CCNC: 115 U/L (ref 30–99)
ALT SERPL-CCNC: 16 U/L (ref 2–50)
ANION GAP SERPL CALC-SCNC: 11 MMOL/L (ref 7–16)
AST SERPL-CCNC: 18 U/L (ref 12–45)
BASOPHILS # BLD AUTO: 0.5 % (ref 0–1.8)
BASOPHILS # BLD: 0.05 K/UL (ref 0–0.12)
BILIRUB SERPL-MCNC: 0.4 MG/DL (ref 0.1–1.5)
BUN SERPL-MCNC: 16 MG/DL (ref 8–22)
CALCIUM SERPL-MCNC: 8.8 MG/DL (ref 8.4–10.2)
CHLORIDE SERPL-SCNC: 104 MMOL/L (ref 96–112)
CO2 SERPL-SCNC: 24 MMOL/L (ref 20–33)
CREAT SERPL-MCNC: 0.86 MG/DL (ref 0.5–1.4)
EOSINOPHIL # BLD AUTO: 0.14 K/UL (ref 0–0.51)
EOSINOPHIL NFR BLD: 1.4 % (ref 0–6.9)
ERYTHROCYTE [DISTWIDTH] IN BLOOD BY AUTOMATED COUNT: 39.8 FL (ref 35.9–50)
GLOBULIN SER CALC-MCNC: 2.3 G/DL (ref 1.9–3.5)
GLUCOSE SERPL-MCNC: 97 MG/DL (ref 65–99)
HCT VFR BLD AUTO: 46.4 % (ref 42–52)
HGB BLD-MCNC: 16.1 G/DL (ref 14–18)
IMM GRANULOCYTES # BLD AUTO: 0.06 K/UL (ref 0–0.11)
IMM GRANULOCYTES NFR BLD AUTO: 0.6 % (ref 0–0.9)
LYMPHOCYTES # BLD AUTO: 2.04 K/UL (ref 1–4.8)
LYMPHOCYTES NFR BLD: 20.9 % (ref 22–41)
MCH RBC QN AUTO: 31.9 PG (ref 27–33)
MCHC RBC AUTO-ENTMCNC: 34.7 G/DL (ref 33.7–35.3)
MCV RBC AUTO: 91.9 FL (ref 81.4–97.8)
MONOCYTES # BLD AUTO: 0.65 K/UL (ref 0–0.85)
MONOCYTES NFR BLD AUTO: 6.7 % (ref 0–13.4)
NEUTROPHILS # BLD AUTO: 6.82 K/UL (ref 1.82–7.42)
NEUTROPHILS NFR BLD: 69.9 % (ref 44–72)
NRBC # BLD AUTO: 0 K/UL
NRBC BLD-RTO: 0 /100 WBC
PLATELET # BLD AUTO: 189 K/UL (ref 164–446)
PMV BLD AUTO: 10.3 FL (ref 9–12.9)
POTASSIUM SERPL-SCNC: 3.8 MMOL/L (ref 3.6–5.5)
PROT SERPL-MCNC: 6.5 G/DL (ref 6–8.2)
RBC # BLD AUTO: 5.05 M/UL (ref 4.7–6.1)
SODIUM SERPL-SCNC: 139 MMOL/L (ref 135–145)
WBC # BLD AUTO: 9.8 K/UL (ref 4.8–10.8)

## 2021-05-06 PROCEDURE — 90471 IMMUNIZATION ADMIN: CPT

## 2021-05-06 PROCEDURE — 700111 HCHG RX REV CODE 636 W/ 250 OVERRIDE (IP): Performed by: EMERGENCY MEDICINE

## 2021-05-06 PROCEDURE — 85025 COMPLETE CBC W/AUTO DIFF WBC: CPT

## 2021-05-06 PROCEDURE — 90715 TDAP VACCINE 7 YRS/> IM: CPT | Performed by: EMERGENCY MEDICINE

## 2021-05-06 PROCEDURE — 700117 HCHG RX CONTRAST REV CODE 255: Performed by: EMERGENCY MEDICINE

## 2021-05-06 PROCEDURE — 71260 CT THORAX DX C+: CPT

## 2021-05-06 PROCEDURE — 70450 CT HEAD/BRAIN W/O DYE: CPT

## 2021-05-06 PROCEDURE — 72128 CT CHEST SPINE W/O DYE: CPT

## 2021-05-06 PROCEDURE — 80053 COMPREHEN METABOLIC PANEL: CPT

## 2021-05-06 PROCEDURE — 96374 THER/PROPH/DIAG INJ IV PUSH: CPT

## 2021-05-06 PROCEDURE — 72131 CT LUMBAR SPINE W/O DYE: CPT

## 2021-05-06 PROCEDURE — 72125 CT NECK SPINE W/O DYE: CPT

## 2021-05-06 RX ORDER — MORPHINE SULFATE 4 MG/ML
4 INJECTION, SOLUTION INTRAMUSCULAR; INTRAVENOUS ONCE
Status: COMPLETED | OUTPATIENT
Start: 2021-05-06 | End: 2021-05-06

## 2021-05-06 RX ADMIN — MORPHINE SULFATE 4 MG: 4 INJECTION INTRAVENOUS at 00:46

## 2021-05-06 RX ADMIN — IOHEXOL 100 ML: 350 INJECTION, SOLUTION INTRAVENOUS at 01:06

## 2021-05-06 RX ADMIN — CLOSTRIDIUM TETANI TOXOID ANTIGEN (FORMALDEHYDE INACTIVATED), CORYNEBACTERIUM DIPHTHERIAE TOXOID ANTIGEN (FORMALDEHYDE INACTIVATED), BORDETELLA PERTUSSIS TOXOID ANTIGEN (GLUTARALDEHYDE INACTIVATED), BORDETELLA PERTUSSIS FILAMENTOUS HEMAGGLUTININ ANTIGEN (FORMALDEHYDE INACTIVATED), BORDETELLA PERTUSSIS PERTACTIN ANTIGEN, AND BORDETELLA PERTUSSIS FIMBRIAE 2/3 ANTIGEN 0.5 ML: 5; 2; 2.5; 5; 3; 5 INJECTION, SUSPENSION INTRAMUSCULAR at 00:07

## 2021-05-06 ASSESSMENT — PAIN DESCRIPTION - PAIN TYPE
TYPE: ACUTE PAIN
TYPE: ACUTE PAIN
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
## 2021-09-03 ENCOUNTER — HOSPITAL ENCOUNTER (OUTPATIENT)
Facility: MEDICAL CENTER | Age: 46
End: 2021-09-03
Attending: PHYSICIAN ASSISTANT
Payer: COMMERCIAL

## 2021-09-03 ENCOUNTER — OFFICE VISIT (OUTPATIENT)
Dept: URGENT CARE | Facility: PHYSICIAN GROUP | Age: 46
End: 2021-09-03
Payer: COMMERCIAL

## 2021-09-03 VITALS
HEIGHT: 73 IN | BODY MASS INDEX: 25.18 KG/M2 | DIASTOLIC BLOOD PRESSURE: 78 MMHG | SYSTOLIC BLOOD PRESSURE: 122 MMHG | RESPIRATION RATE: 18 BRPM | HEART RATE: 59 BPM | OXYGEN SATURATION: 98 % | TEMPERATURE: 98.3 F | WEIGHT: 190 LBS

## 2021-09-03 DIAGNOSIS — R09.81 SINUS CONGESTION: ICD-10-CM

## 2021-09-03 DIAGNOSIS — Z20.822 SUSPECTED COVID-19 VIRUS INFECTION: ICD-10-CM

## 2021-09-03 DIAGNOSIS — R05.9 COUGH: ICD-10-CM

## 2021-09-03 DIAGNOSIS — J02.9 PHARYNGITIS, UNSPECIFIED ETIOLOGY: ICD-10-CM

## 2021-09-03 PROBLEM — R07.9 CHEST PAIN: Status: ACTIVE | Noted: 2021-09-03

## 2021-09-03 LAB
INT CON NEG: NORMAL
INT CON POS: NORMAL
S PYO AG THROAT QL: NEGATIVE

## 2021-09-03 PROCEDURE — 87880 STREP A ASSAY W/OPTIC: CPT | Performed by: PHYSICIAN ASSISTANT

## 2021-09-03 PROCEDURE — U0005 INFEC AGEN DETEC AMPLI PROBE: HCPCS

## 2021-09-03 PROCEDURE — 99213 OFFICE O/P EST LOW 20 MIN: CPT | Mod: CS | Performed by: PHYSICIAN ASSISTANT

## 2021-09-03 PROCEDURE — U0003 INFECTIOUS AGENT DETECTION BY NUCLEIC ACID (DNA OR RNA); SEVERE ACUTE RESPIRATORY SYNDROME CORONAVIRUS 2 (SARS-COV-2) (CORONAVIRUS DISEASE [COVID-19]), AMPLIFIED PROBE TECHNIQUE, MAKING USE OF HIGH THROUGHPUT TECHNOLOGIES AS DESCRIBED BY CMS-2020-01-R: HCPCS

## 2021-09-03 RX ORDER — AMOXICILLIN AND CLAVULANATE POTASSIUM 875; 125 MG/1; MG/1
1 TABLET, FILM COATED ORAL 2 TIMES DAILY
Qty: 20 TABLET | Refills: 0 | Status: SHIPPED | OUTPATIENT
Start: 2021-09-03 | End: 2022-01-28

## 2021-09-03 ASSESSMENT — ENCOUNTER SYMPTOMS
DIARRHEA: 0
FEVER: 0
ABDOMINAL PAIN: 0
VOMITING: 0
SORE THROAT: 1
SINUS PAIN: 1
NAUSEA: 0
WHEEZING: 0
CHILLS: 1
SPUTUM PRODUCTION: 1
MYALGIAS: 1
SHORTNESS OF BREATH: 0
COUGH: 1

## 2021-09-03 ASSESSMENT — FIBROSIS 4 INDEX: FIB4 SCORE: 1.047619047619047619

## 2021-09-03 NOTE — LETTER
September 3, 2021       Patient: Joaquin Donaldson   YOB: 1975   Date of Visit: 9/3/2021           To Whom it May Concern,   Your employee/student was seen in our clinic today. A concern for COVID-19 has been identified and testing is in progress.?   ?  We are asking you to excuse absences while following self-isolation protocol per Center for Disease Control (CDC) guidelines. Your employee/student will be able to access test results through our electronic delivery system called [employer].?   ?  If the results of testing are negative, and once there has been no fever (temperature >100.4 F) for at least 72 hours without treatment, and no vomiting or diarrhea for at least 48 hours, then return to work/school is approved.   ?  If the results of testing are positive then your employee/student will be contacted by the [facility] or [facility] for further instructions on duration of self-isolation and return to work/school protocol. In general, this will also follow the CDC guidelines with a minimum of 10 days from the onset of symptoms and without fever, vomiting, or diarrhea as above.?   ?  In general, repeat testing is not necessary and not offered through our [facility].?   ?  This is the only note that will be provided from [facility] for this visit. Your employee/student will require an appointment with a primary care provider if FMLA or disability forms are required.   ?  Sincerely,?           Emir Vaz P.A.-C.  Electronically Signed

## 2021-09-04 DIAGNOSIS — R05.9 COUGH: ICD-10-CM

## 2021-09-04 LAB — COVID ORDER STATUS COVID19: NORMAL

## 2021-09-04 NOTE — PROGRESS NOTES
"Subjective:   Joaquin Donaldson  is a 45 y.o. male who presents for Sore Throat (chills, chest pain, weakness, cough, runny nose, x1 day. )      HPI    Patient presents urgent care complaining of \"cold symptoms\".  He notes chills and subjective fever at night last night.  Complains of significant chest congestion and feeling rundown.  He complains of productive cough with \"green sputum\".  He notes runny nose and sinus congestion.  Patient is status post Covid vaccination.  He denies loss of taste or smell.  He complains of sore throat as well as bad productive coughing.  He denies ear pain.  Denies nausea vomiting abdominal pain diarrhea or rash.  He denies chest pain stating he has bad chest congestion and history of bronchitis.  He notes childhood pneumonia.  He denies exposure to individuals concerning for Covid.  Has used NyQuil with moderate improved symptoms.  He is concerned about requiring antibiotics for sinusitis or bronchitis.  He is amenable to Covid testing.    Review of Systems   Constitutional: Positive for chills. Negative for fever.   HENT: Positive for congestion, sinus pain and sore throat. Negative for ear pain.    Respiratory: Positive for cough and sputum production. Negative for shortness of breath and wheezing.    Cardiovascular: Negative for chest pain and leg swelling.   Gastrointestinal: Negative for abdominal pain, diarrhea, nausea and vomiting.   Musculoskeletal: Positive for myalgias.   Skin: Negative for rash.       No Known Allergies     Objective:   /78   Pulse (!) 59   Temp 36.8 °C (98.3 °F) (Temporal)   Resp 18   Ht 1.854 m (6' 1\")   Wt 86.2 kg (190 lb)   SpO2 98%   BMI 25.07 kg/m²     Physical Exam  Vitals and nursing note reviewed.   Constitutional:       General: He is not in acute distress.     Appearance: He is well-developed. He is not diaphoretic.   HENT:      Head: Normocephalic and atraumatic.      Right Ear: Tympanic membrane, ear canal and external " ear normal.      Left Ear: Tympanic membrane, ear canal and external ear normal.      Nose: Nose normal.      Mouth/Throat:      Pharynx: Uvula midline. Posterior oropharyngeal erythema ( mild PND) present. No oropharyngeal exudate.      Tonsils: No tonsillar abscesses.   Eyes:      General: No scleral icterus.        Right eye: No discharge.         Left eye: No discharge.      Conjunctiva/sclera: Conjunctivae normal.   Pulmonary:      Effort: Pulmonary effort is normal. No respiratory distress.      Breath sounds: No decreased breath sounds, wheezing, rhonchi or rales.   Musculoskeletal:         General: Normal range of motion.      Cervical back: Neck supple.   Lymphadenopathy:      Cervical: Cervical adenopathy ( mild bilat) present.   Skin:     General: Skin is warm and dry.      Coloration: Skin is not pale.   Neurological:      Mental Status: He is alert and oriented to person, place, and time.      Coordination: Coordination normal.     POCT strep - NEG  COVID pending    Assessment/Plan:   1. Pharyngitis, unspecified etiology  - POCT Rapid Strep A    2. Cough  - COVID/SARS CoV-2 PCR; Future    3. Suspected COVID-19 virus infection    4. Sinus congestion  - amoxicillin-clavulanate (AUGMENTIN) 875-125 MG Tab; Take 1 Tablet by mouth 2 times a day.  Dispense: 20 Tablet; Refill: 0  Supportive care is reviewed with patient/caregiver - recommend to push PO fluids and electrolytes, over-the-counter symptom support medications reviewed, ER precautions with worsened symptoms, quarantine recommendations reviewed, sent with letter, MyChart for results of testing  Contingent antibiotic prescription given to patient to fill upon meeting criteria of guidelines discussed.    Take full course of Rx, take with probiotics, observe for resolution  Return to clinic with lack of resolution or progression of symptoms.  ER precautions with any worsening symptoms are reviewed with patient/caregiver and they do express  understanding    I have worn an N95 mask, gloves and eye protection for the entire encounter with this patient.     Differential diagnosis, natural history, supportive care, and indications for immediate follow-up discussed.

## 2021-09-05 LAB
SARS-COV-2 RNA RESP QL NAA+PROBE: NOTDETECTED
SPECIMEN SOURCE: NORMAL

## 2022-01-28 ENCOUNTER — OFFICE VISIT (OUTPATIENT)
Dept: URGENT CARE | Facility: PHYSICIAN GROUP | Age: 47
End: 2022-01-28
Payer: COMMERCIAL

## 2022-01-28 VITALS
BODY MASS INDEX: 23.86 KG/M2 | HEIGHT: 73 IN | WEIGHT: 180 LBS | HEART RATE: 70 BPM | SYSTOLIC BLOOD PRESSURE: 132 MMHG | OXYGEN SATURATION: 96 % | TEMPERATURE: 98.1 F | RESPIRATION RATE: 16 BRPM | DIASTOLIC BLOOD PRESSURE: 84 MMHG

## 2022-01-28 DIAGNOSIS — S16.1XXA STRAIN OF NECK MUSCLE, INITIAL ENCOUNTER: ICD-10-CM

## 2022-01-28 DIAGNOSIS — M62.838 MUSCLE SPASM: ICD-10-CM

## 2022-01-28 PROCEDURE — 99213 OFFICE O/P EST LOW 20 MIN: CPT | Performed by: PHYSICIAN ASSISTANT

## 2022-01-28 RX ORDER — CYCLOBENZAPRINE HCL 5 MG
5-10 TABLET ORAL 3 TIMES DAILY PRN
Qty: 30 TABLET | Refills: 0 | Status: SHIPPED | OUTPATIENT
Start: 2022-01-28 | End: 2022-11-25

## 2022-01-28 RX ORDER — KETOROLAC TROMETHAMINE 30 MG/ML
30 INJECTION, SOLUTION INTRAMUSCULAR; INTRAVENOUS ONCE
Status: COMPLETED | OUTPATIENT
Start: 2022-01-28 | End: 2022-01-28

## 2022-01-28 RX ADMIN — KETOROLAC TROMETHAMINE 30 MG: 30 INJECTION, SOLUTION INTRAMUSCULAR; INTRAVENOUS at 16:02

## 2022-01-28 ASSESSMENT — FIBROSIS 4 INDEX: FIB4 SCORE: 1.071428571428571429

## 2022-01-28 ASSESSMENT — ENCOUNTER SYMPTOMS: NECK PAIN: 1

## 2022-01-28 NOTE — LETTER
January 28, 2022    To Whom It May Concern:         This is confirmation that Joaquin Donaldson attended his scheduled appointment with Esau Lebron P.A.-C. on 1/28/22. Please excuse this patient from work due to recent ailment.          If you have any questions please do not hesitate to call me at the phone number listed below.    Sincerely,          Esau Lebron P.A.-C.  [phone]
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
## 2022-01-31 ASSESSMENT — ENCOUNTER SYMPTOMS
PHOTOPHOBIA: 0
HEADACHES: 0
BACK PAIN: 0
TINGLING: 0
TROUBLE SWALLOWING: 0
FALLS: 0
DIZZINESS: 0
DIARRHEA: 0
VOMITING: 0
NUMBNESS: 0

## 2022-07-07 ENCOUNTER — HOSPITAL ENCOUNTER (OUTPATIENT)
Dept: RADIOLOGY | Facility: MEDICAL CENTER | Age: 47
End: 2022-07-07
Attending: NURSE PRACTITIONER
Payer: COMMERCIAL

## 2022-07-07 ENCOUNTER — TELEPHONE (OUTPATIENT)
Dept: URGENT CARE | Facility: PHYSICIAN GROUP | Age: 47
End: 2022-07-07

## 2022-07-07 ENCOUNTER — OFFICE VISIT (OUTPATIENT)
Dept: URGENT CARE | Facility: PHYSICIAN GROUP | Age: 47
End: 2022-07-07
Payer: COMMERCIAL

## 2022-07-07 ENCOUNTER — HOSPITAL ENCOUNTER (OUTPATIENT)
Dept: LAB | Facility: MEDICAL CENTER | Age: 47
End: 2022-07-07
Attending: NURSE PRACTITIONER
Payer: COMMERCIAL

## 2022-07-07 VITALS
HEART RATE: 94 BPM | OXYGEN SATURATION: 98 % | WEIGHT: 205 LBS | BODY MASS INDEX: 27.17 KG/M2 | SYSTOLIC BLOOD PRESSURE: 116 MMHG | DIASTOLIC BLOOD PRESSURE: 82 MMHG | RESPIRATION RATE: 20 BRPM | HEIGHT: 73 IN | TEMPERATURE: 97.7 F

## 2022-07-07 DIAGNOSIS — R10.84 GENERALIZED ABDOMINAL PAIN: ICD-10-CM

## 2022-07-07 LAB
ALBUMIN SERPL BCP-MCNC: 4.5 G/DL (ref 3.2–4.9)
ALBUMIN/GLOB SERPL: 2 G/DL
ALP SERPL-CCNC: 98 U/L (ref 30–99)
ALT SERPL-CCNC: 33 U/L (ref 2–50)
ANION GAP SERPL CALC-SCNC: 10 MMOL/L (ref 7–16)
AST SERPL-CCNC: 25 U/L (ref 12–45)
BASOPHILS # BLD AUTO: 0.4 % (ref 0–1.8)
BASOPHILS # BLD: 0.02 K/UL (ref 0–0.12)
BILIRUB SERPL-MCNC: 1 MG/DL (ref 0.1–1.5)
BUN SERPL-MCNC: 10 MG/DL (ref 8–22)
CALCIUM SERPL-MCNC: 9.2 MG/DL (ref 8.5–10.5)
CHLORIDE SERPL-SCNC: 100 MMOL/L (ref 96–112)
CO2 SERPL-SCNC: 27 MMOL/L (ref 20–33)
CREAT SERPL-MCNC: 0.95 MG/DL (ref 0.5–1.4)
EOSINOPHIL # BLD AUTO: 0.12 K/UL (ref 0–0.51)
EOSINOPHIL NFR BLD: 2.1 % (ref 0–6.9)
ERYTHROCYTE [DISTWIDTH] IN BLOOD BY AUTOMATED COUNT: 40.3 FL (ref 35.9–50)
GFR SERPLBLD CREATININE-BSD FMLA CKD-EPI: 100 ML/MIN/1.73 M 2
GLOBULIN SER CALC-MCNC: 2.3 G/DL (ref 1.9–3.5)
GLUCOSE SERPL-MCNC: 89 MG/DL (ref 65–99)
HCT VFR BLD AUTO: 47.1 % (ref 42–52)
HGB BLD-MCNC: 16.6 G/DL (ref 14–18)
IMM GRANULOCYTES # BLD AUTO: 0.04 K/UL (ref 0–0.11)
IMM GRANULOCYTES NFR BLD AUTO: 0.7 % (ref 0–0.9)
LIPASE SERPL-CCNC: 12 U/L (ref 11–82)
LYMPHOCYTES # BLD AUTO: 0.71 K/UL (ref 1–4.8)
LYMPHOCYTES NFR BLD: 12.7 % (ref 22–41)
MCH RBC QN AUTO: 32.1 PG (ref 27–33)
MCHC RBC AUTO-ENTMCNC: 35.2 G/DL (ref 33.7–35.3)
MCV RBC AUTO: 91.1 FL (ref 81.4–97.8)
MONOCYTES # BLD AUTO: 0.35 K/UL (ref 0–0.85)
MONOCYTES NFR BLD AUTO: 6.3 % (ref 0–13.4)
NEUTROPHILS # BLD AUTO: 4.36 K/UL (ref 1.82–7.42)
NEUTROPHILS NFR BLD: 77.8 % (ref 44–72)
NRBC # BLD AUTO: 0 K/UL
NRBC BLD-RTO: 0 /100 WBC
PLATELET # BLD AUTO: 163 K/UL (ref 164–446)
PMV BLD AUTO: 10.1 FL (ref 9–12.9)
POTASSIUM SERPL-SCNC: 3.4 MMOL/L (ref 3.6–5.5)
PROT SERPL-MCNC: 6.8 G/DL (ref 6–8.2)
RBC # BLD AUTO: 5.17 M/UL (ref 4.7–6.1)
SODIUM SERPL-SCNC: 137 MMOL/L (ref 135–145)
WBC # BLD AUTO: 5.6 K/UL (ref 4.8–10.8)

## 2022-07-07 PROCEDURE — 76700 US EXAM ABDOM COMPLETE: CPT

## 2022-07-07 PROCEDURE — 36415 COLL VENOUS BLD VENIPUNCTURE: CPT

## 2022-07-07 PROCEDURE — 99214 OFFICE O/P EST MOD 30 MIN: CPT | Performed by: NURSE PRACTITIONER

## 2022-07-07 PROCEDURE — 80053 COMPREHEN METABOLIC PANEL: CPT

## 2022-07-07 PROCEDURE — 83690 ASSAY OF LIPASE: CPT

## 2022-07-07 PROCEDURE — 85025 COMPLETE CBC W/AUTO DIFF WBC: CPT

## 2022-07-07 RX ORDER — ALPRAZOLAM 0.25 MG/1
TABLET ORAL
COMMUNITY
Start: 2022-05-31

## 2022-07-07 RX ORDER — FENOFIBRATE 54 MG/1
TABLET ORAL
COMMUNITY
Start: 2022-06-27 | End: 2024-03-13

## 2022-07-07 RX ORDER — TADALAFIL 10 MG/1
TABLET ORAL
COMMUNITY
Start: 2022-05-31

## 2022-07-07 ASSESSMENT — ENCOUNTER SYMPTOMS
CHILLS: 1
BLOOD IN STOOL: 0
FEVER: 1
VOMITING: 0
NAUSEA: 1
ABDOMINAL PAIN: 1
DIARRHEA: 0
FLATUS: 1
HEARTBURN: 0
CONSTIPATION: 1

## 2022-07-07 ASSESSMENT — FIBROSIS 4 INDEX: FIB4 SCORE: 1.071428571428571429

## 2022-07-07 NOTE — LETTER
July 7, 2022    To Whom It May Concern:         This is confirmation that Joaquin Jose L Donaldson attended his scheduled appointment with BASHIR Diana on 7/07/22.   He may return to work 7/10/2022 or sooner if better.        If you have any questions please do not hesitate to call me at the phone number listed below.    Sincerely,          CARMELINA Diana.  [phone]

## 2022-07-07 NOTE — PROGRESS NOTES
Subjective:     Joaquin Donaldson is a 46 y.o. male who presents for Abdominal Pain (Centralized; onset has gotten worst last 2x wks )      Abdominal Pain  This is a new problem. The current episode started 1 to 4 weeks ago (Joaquin is a pleasant 46 year old male who presents to  today with complaints of abdominal pain that has been ongoing for the past two months. He states his pain is constant. He does have frequent bowel movements mixed with episodes of constipation.). The pain is at a severity of 4/10. The quality of the pain is aching, cramping and sharp. Associated symptoms include constipation, a fever, flatus and nausea. Pertinent negatives include no diarrhea, dysuria, frequency, melena or vomiting. He has tried nothing for the symptoms. His past medical history is significant for abdominal surgery. Inguinal hernia X 3   He states that he does have 2-3 alcoholic beverages per night approximately 3 to the past few days he has not had any alcoholic beverages.  His pain level does radiate from moderate to severe.  He notes that his primary care provider did order an ultrasound for him to have completed at the end of this month.  He also has a colonoscopy scheduled next month.    Review of Systems   Constitutional: Positive for chills and fever.   Gastrointestinal: Positive for abdominal pain, constipation, flatus and nausea. Negative for blood in stool, diarrhea, heartburn, melena and vomiting.   Genitourinary: Negative for dysuria, frequency and urgency.       PMH:   Past Medical History:   Diagnosis Date   • Bronchitis 02/2020   • GERD (gastroesophageal reflux disease)    • Psychiatric problem     depression and anxiety     ALLERGIES: No Known Allergies  SURGHX:   Past Surgical History:   Procedure Laterality Date   • INGUINAL HERNIA REPAIR ROBOTIC XI Bilateral 7/23/2020    Procedure: REPAIR, HERNIA, INGUINAL, ROBOT-ASSISTED, USING DA ADALGISA XI-WITH MESH;  Surgeon: Radha Cruz M.D.;   "Location: SURGERY [facility];  Service: Gen Robotic   • HERNIA REPAIR  1995   • KNEE ARTHROSCOPY Right      SOCHX:   Social History     Socioeconomic History   • Marital status:    Tobacco Use   • Smoking status: Former Smoker     Packs/day: 0.25     Types: Cigarettes   • Smokeless tobacco: Never Used   Vaping Use   • Vaping Use: Never used   Substance and Sexual Activity   • Alcohol use: Yes     Comment: soc   • Drug use: Not Currently     Comment: marijuana     FH: No family history on file.      Objective:   /82 (BP Location: Right arm, Patient Position: Sitting, BP Cuff Size: Adult)   Pulse 94   Temp 36.5 °C (97.7 °F) (Temporal)   Resp 20   Ht 1.854 m (6' 1\")   Wt 93 kg (205 lb)   SpO2 98%   BMI 27.05 kg/m²     Physical Exam  Vitals and nursing note reviewed.   Constitutional:       General: He is not in acute distress.     Appearance: Normal appearance. He is not ill-appearing.   HENT:      Head: Normocephalic and atraumatic.      Right Ear: External ear normal.      Left Ear: External ear normal.      Nose: No congestion or rhinorrhea.      Mouth/Throat:      Mouth: Mucous membranes are moist.   Eyes:      Extraocular Movements: Extraocular movements intact.      Pupils: Pupils are equal, round, and reactive to light.   Cardiovascular:      Rate and Rhythm: Normal rate and regular rhythm.      Pulses: Normal pulses.      Heart sounds: Normal heart sounds.   Pulmonary:      Effort: Pulmonary effort is normal.      Breath sounds: Normal breath sounds.   Abdominal:      General: Abdomen is flat. Bowel sounds are normal.      Palpations: Abdomen is soft.      Tenderness: There is abdominal tenderness in the left upper quadrant. There is no right CVA tenderness or left CVA tenderness.          Comments: Patient notes pain with palpation of general abdomen however, there is guarding of left upper quadrant   Musculoskeletal:         General: Normal range of motion.      Cervical back: Normal " range of motion and neck supple.   Skin:     General: Skin is warm and dry.      Capillary Refill: Capillary refill takes less than 2 seconds.   Neurological:      General: No focal deficit present.      Mental Status: He is alert and oriented to person, place, and time. Mental status is at baseline.   Psychiatric:         Mood and Affect: Mood normal.         Behavior: Behavior normal.         Thought Content: Thought content normal.         Judgment: Judgment normal.         Assessment/Plan:   Assessment    1. Generalized abdominal pain  US-ABDOMEN COMPLETE SURVEY    CBC WITH DIFFERENTIAL    Comp Metabolic Panel    LIPASE     Differential diagnoses discussed.  CBC, CMP and lipase ordered for evaluation of abdominal pain.  Stat ultrasound of abdomen ordered for evaluation of gallstones, appendicitis and pancreatitis.  I will notify him of results.  AVS handout given and reviewed with patient. Pt educated on red flags and when to seek treatment back in ER or UC.

## 2022-07-11 ENCOUNTER — HOSPITAL ENCOUNTER (OUTPATIENT)
Dept: RADIOLOGY | Facility: MEDICAL CENTER | Age: 47
End: 2022-07-11
Attending: NURSE PRACTITIONER
Payer: COMMERCIAL

## 2022-07-11 ENCOUNTER — TELEPHONE (OUTPATIENT)
Dept: URGENT CARE | Facility: PHYSICIAN GROUP | Age: 47
End: 2022-07-11
Payer: COMMERCIAL

## 2022-07-11 DIAGNOSIS — R10.84 GENERALIZED ABDOMINAL PAIN: ICD-10-CM

## 2022-07-11 DIAGNOSIS — R16.1 SPLENOMEGALY: ICD-10-CM

## 2022-07-11 DIAGNOSIS — K76.6 HYPERTENSION, PORTAL (HCC): ICD-10-CM

## 2022-07-11 DIAGNOSIS — R11.0 NAUSEA IN ADULT: ICD-10-CM

## 2022-07-11 PROCEDURE — 700117 HCHG RX CONTRAST REV CODE 255: Performed by: NURSE PRACTITIONER

## 2022-07-11 PROCEDURE — 74177 CT ABD & PELVIS W/CONTRAST: CPT

## 2022-07-11 RX ORDER — ONDANSETRON 4 MG/1
4 TABLET, ORALLY DISINTEGRATING ORAL EVERY 6 HOURS PRN
Qty: 20 TABLET | Refills: 0 | Status: SHIPPED | OUTPATIENT
Start: 2022-07-11 | End: 2022-07-16

## 2022-07-11 RX ADMIN — IOHEXOL 100 ML: 350 INJECTION, SOLUTION INTRAVENOUS at 15:15

## 2022-07-14 ENCOUNTER — TELEPHONE (OUTPATIENT)
Dept: URGENT CARE | Facility: PHYSICIAN GROUP | Age: 47
End: 2022-07-14

## 2022-07-14 NOTE — TELEPHONE ENCOUNTER
1. Name: Joaquin Donaldson      Call Back Number: [phone] (home)         How would the patient prefer to be contacted with a response: Phone call OK to leave a detailed message    Pt called asking if you can write him a work note saying that he is release to full duty with with no restricts

## 2022-11-25 ENCOUNTER — OFFICE VISIT (OUTPATIENT)
Dept: URGENT CARE | Facility: PHYSICIAN GROUP | Age: 47
End: 2022-11-25
Payer: COMMERCIAL

## 2022-11-25 VITALS
WEIGHT: 205 LBS | BODY MASS INDEX: 27.17 KG/M2 | SYSTOLIC BLOOD PRESSURE: 120 MMHG | OXYGEN SATURATION: 97 % | HEART RATE: 76 BPM | RESPIRATION RATE: 16 BRPM | HEIGHT: 73 IN | DIASTOLIC BLOOD PRESSURE: 64 MMHG | TEMPERATURE: 98.6 F

## 2022-11-25 DIAGNOSIS — J22 LOWER RESPIRATORY INFECTION: ICD-10-CM

## 2022-11-25 PROCEDURE — 99213 OFFICE O/P EST LOW 20 MIN: CPT | Performed by: FAMILY MEDICINE

## 2022-11-25 RX ORDER — ALBUTEROL SULFATE 90 UG/1
2 AEROSOL, METERED RESPIRATORY (INHALATION) EVERY 6 HOURS PRN
Qty: 8.5 G | Refills: 0 | Status: SHIPPED | OUTPATIENT
Start: 2022-11-25

## 2022-11-25 RX ORDER — DOXYCYCLINE HYCLATE 100 MG
100 TABLET ORAL 2 TIMES DAILY
Qty: 14 TABLET | Refills: 0 | Status: SHIPPED | OUTPATIENT
Start: 2022-11-25 | End: 2023-08-08

## 2022-11-25 RX ORDER — OMEPRAZOLE 20 MG/1
20 CAPSULE, DELAYED RELEASE ORAL DAILY
COMMUNITY
End: 2024-03-13

## 2022-11-25 ASSESSMENT — ENCOUNTER SYMPTOMS: COUGH: 1

## 2022-11-25 ASSESSMENT — FIBROSIS 4 INDEX: FIB4 SCORE: 1.23

## 2022-11-25 NOTE — PROGRESS NOTES
Subjective:     Joaquin Donaldson is a 47 y.o. male who presents for URI (X 1 week with hard time breathing and feels like he has a lot of stuff in chest and unable to cough it out and hot and cold)    HPI  Pt presents for evaluation of an acute problem  Patient with a cough for the last week  Symptoms started suddenly and rapidly worsened   Had some improvement of symptoms, however worsening the past 2 days   Having increased cough   Cough is productive   Has nasal congestion   No new fevers   Having chills     Review of Systems   Constitutional:  Positive for malaise/fatigue.   HENT:  Positive for congestion.    Respiratory:  Positive for cough.      PMH:  has a past medical history of Bronchitis (02/2020), GERD (gastroesophageal reflux disease), and Psychiatric problem.  MEDS:   Current Outpatient Medications:     omeprazole (PRILOSEC) 20 MG delayed-release capsule, Take 20 mg by mouth every day., Disp: , Rfl:     albuterol 108 (90 Base) MCG/ACT Aero Soln inhalation aerosol, Inhale 2 Puffs every 6 hours as needed for Shortness of Breath., Disp: 8.5 g, Rfl: 0    doxycycline (VIBRAMYCIN) 100 MG Tab, Take 1 Tablet by mouth 2 times a day., Disp: 14 Tablet, Rfl: 0    ALPRAZolam (XANAX) 0.25 MG Tab, TAKE 1 TABLET BY MOUTH ONCE DAILY AS NEEDED FOR 30 DAYS, Disp: , Rfl:     fenofibrate (TRICOR) 54 MG tablet, , Disp: , Rfl:     tadalafil (CIALIS) 10 MG tablet, TAKE ONE-HALF TABLET TO ONE TABLET BY MOUTH 1 HOUR PRIOR TO SEXUAL ACTIVITY., Disp: , Rfl:     fluoxetine (PROZAC) 40 MG capsule, Take 40 mg by mouth., Disp: , Rfl:   ALLERGIES: No Known Allergies  SURGHX:   Past Surgical History:   Procedure Laterality Date    INGUINAL HERNIA REPAIR ROBOTIC XI Bilateral 7/23/2020    Procedure: REPAIR, HERNIA, INGUINAL, ROBOT-ASSISTED, USING DA ADALGISA XI-WITH MESH;  Surgeon: Radha Cruz M.D.;  Location: SURGERY [facility];  Service: Gen Robotic    HERNIA REPAIR  1995    KNEE ARTHROSCOPY Right      SOCHX:  reports  "that he has quit smoking. His smoking use included cigarettes. He smoked an average of .25 packs per day. He has never used smokeless tobacco. He reports current alcohol use. He reports that he does not currently use drugs.     Objective:   /64 (BP Location: Right arm, Patient Position: Sitting, BP Cuff Size: Adult)   Pulse 76   Temp 37 °C (98.6 °F) (Temporal)   Resp 16   Ht 1.854 m (6' 1\")   Wt 93 kg (205 lb)   SpO2 97%   BMI 27.05 kg/m²     Physical Exam  Constitutional:       General: He is not in acute distress.     Appearance: He is well-developed. He is not diaphoretic.   HENT:      Head: Normocephalic and atraumatic.      Right Ear: Tympanic membrane, ear canal and external ear normal.      Left Ear: Tympanic membrane, ear canal and external ear normal.      Nose: Nose normal.      Mouth/Throat:      Mouth: Mucous membranes are moist.      Pharynx: Oropharynx is clear. No oropharyngeal exudate or posterior oropharyngeal erythema.   Neck:      Trachea: No tracheal deviation.   Cardiovascular:      Rate and Rhythm: Normal rate and regular rhythm.      Heart sounds: Normal heart sounds.   Pulmonary:      Effort: Pulmonary effort is normal. No respiratory distress.      Breath sounds: No rales.      Comments: Few scattered end expiratory wheezes  Musculoskeletal:         General: Normal range of motion.      Cervical back: Normal range of motion and neck supple. No tenderness.   Lymphadenopathy:      Cervical: No cervical adenopathy.   Skin:     General: Skin is warm and dry.      Findings: No rash.   Neurological:      Mental Status: He is alert.   Psychiatric:         Behavior: Behavior normal.         Thought Content: Thought content normal.         Judgment: Judgment normal.       Assessment/Plan:   Assessment    1. Lower respiratory infection  - albuterol 108 (90 Base) MCG/ACT Aero Soln inhalation aerosol; Inhale 2 Puffs every 6 hours as needed for Shortness of Breath.  Dispense: 8.5 g; Refill: " 0  - doxycycline (VIBRAMYCIN) 100 MG Tab; Take 1 Tablet by mouth 2 times a day.  Dispense: 14 Tablet; Refill: 0    Other orders  - omeprazole (PRILOSEC) 20 MG delayed-release capsule; Take 20 mg by mouth every day.    Patient with acute illness for over a week had a period of improvement and then rapidly worsened the past few days.  Concern for secondary bacterial infection.  No distinct pneumonia on exam.  Given albuterol inhaler and doxycycline.  Reviewed other supportive care measures and expected course recovery.  Follow-up as needed.

## 2022-11-25 NOTE — LETTER
November 25, 2022    To Whom It May Concern:         This is confirmation that Joaquin Donaldson attended his scheduled appointment with Salo Felipe M.D. on 11/25/22.  Please excuse him from work today for his doctor's appointment.         If you have any questions please do not hesitate to call me at the phone number listed below.    Sincerely,          Salo Felipe M.D.  [phone]

## 2022-11-25 NOTE — LETTER
November 25, 2022          To Whom It May Concern:               This is confirmation that Joaquin Donaldson attended his scheduled appointment with Salo Felipe M.D. on 11/25/22.  Please excuse him from work today and tomorrow 11/26/2022.      If you have any questions please do not hesitate to call me at the phone number listed below.        Sincerely,          Salo Felipe M.D.    Electronically Signed

## 2023-01-10 ENCOUNTER — OFFICE VISIT (OUTPATIENT)
Dept: URGENT CARE | Facility: PHYSICIAN GROUP | Age: 48
End: 2023-01-10
Payer: COMMERCIAL

## 2023-01-10 VITALS
OXYGEN SATURATION: 95 % | SYSTOLIC BLOOD PRESSURE: 124 MMHG | TEMPERATURE: 98.1 F | BODY MASS INDEX: 27.83 KG/M2 | HEART RATE: 68 BPM | DIASTOLIC BLOOD PRESSURE: 88 MMHG | HEIGHT: 73 IN | WEIGHT: 210 LBS | RESPIRATION RATE: 12 BRPM

## 2023-01-10 DIAGNOSIS — J06.9 UPPER RESPIRATORY TRACT INFECTION, UNSPECIFIED TYPE: ICD-10-CM

## 2023-01-10 LAB
FLUAV+FLUBV AG SPEC QL IA: NEGATIVE
INT CON NEG: NORMAL
INT CON POS: NORMAL

## 2023-01-10 PROCEDURE — 99213 OFFICE O/P EST LOW 20 MIN: CPT | Performed by: PHYSICIAN ASSISTANT

## 2023-01-10 PROCEDURE — 87804 INFLUENZA ASSAY W/OPTIC: CPT | Performed by: PHYSICIAN ASSISTANT

## 2023-01-10 ASSESSMENT — ENCOUNTER SYMPTOMS
HEADACHES: 1
FEVER: 1
SORE THROAT: 0
VOMITING: 0
ABDOMINAL PAIN: 0
SPUTUM PRODUCTION: 0
DIZZINESS: 0
MYALGIAS: 1
NAUSEA: 1
WHEEZING: 0
SHORTNESS OF BREATH: 0
DIARRHEA: 0
DIAPHORESIS: 0
SINUS PAIN: 0
CHILLS: 1
PALPITATIONS: 0
COUGH: 1

## 2023-01-10 ASSESSMENT — FIBROSIS 4 INDEX: FIB4 SCORE: 1.23

## 2023-01-10 NOTE — PROGRESS NOTES
Subjective:     CHIEF COMPLAINT  Chief Complaint   Patient presents with    Shortness of Breath     Fatigued, cough comes and goes. Using inhaler more than usual, sweating randomly, nauseous. X2 days       HPI  Joaquin Donaldson is a very pleasant 47 y.o. male who presents to the clinic with flulike symptoms x2-3 days.  Patient states symptoms started abruptly.  Describes generalized body aches, chills and fatigue.  He was also running a subjective fever with sweats.  He then developed sinus congestion and cough.  Cough was predominantly dry nonproductive.  He was experiencing shortness of breath yesterday after coughing fits.  He has been using his albuterol multiple times daily which provides relief for a short amount of time.  States his symptoms have greatly improved over the last 24 hours.  Cough has for the most part subsided.  No longer short of breath.  Body aches and chills have also improved.  He has been taking OTC cough and cold medication with moderate relief.  No known ill contacts.    REVIEW OF SYSTEMS  Review of Systems   Constitutional:  Positive for chills, fever and malaise/fatigue. Negative for diaphoresis.   HENT:  Positive for congestion. Negative for ear pain, sinus pain and sore throat.    Respiratory:  Positive for cough. Negative for sputum production, shortness of breath and wheezing.    Cardiovascular:  Negative for chest pain and palpitations.   Gastrointestinal:  Positive for nausea. Negative for abdominal pain, diarrhea and vomiting.   Musculoskeletal:  Positive for myalgias.   Neurological:  Positive for headaches. Negative for dizziness.   Endo/Heme/Allergies:  Negative for environmental allergies.     PAST MEDICAL HISTORY  Patient Active Problem List    Diagnosis Date Noted    Chest pain 09/03/2021    Marijuana smoker 07/23/2020    Tobacco dependency 07/23/2020    Smoker 07/23/2020    Bilateral inguinal hernia without obstruction or gangrene 07/23/2020       SURGICAL  "HISTORY   has a past surgical history that includes hernia repair (1995); knee arthroscopy (Right); and inguinal hernia repair robotic xi (Bilateral, 7/23/2020).    ALLERGIES  No Known Allergies    CURRENT MEDICATIONS  Home Medications       Reviewed by Christoph Edwards P.A.-C. (Physician Assistant) on 01/10/23 at 1304  Med List Status: <None>     Medication Last Dose Status   albuterol 108 (90 Base) MCG/ACT Aero Soln inhalation aerosol PRN Active   ALPRAZolam (XANAX) 0.25 MG Tab PRN Active   doxycycline (VIBRAMYCIN) 100 MG Tab Taking Active   fenofibrate (TRICOR) 54 MG tablet Taking Active   fluoxetine (PROZAC) 40 MG capsule Taking Active   omeprazole (PRILOSEC) 20 MG delayed-release capsule Taking Active   tadalafil (CIALIS) 10 MG tablet Taking Active                    SOCIAL HISTORY  Social History     Tobacco Use    Smoking status: Former     Packs/day: 0.25     Types: Cigarettes    Smokeless tobacco: Never   Vaping Use    Vaping Use: Never used   Substance and Sexual Activity    Alcohol use: Yes     Alcohol/week: 2.4 oz     Types: 4 Cans of beer per week     Comment: soc    Drug use: Not Currently     Comment: marijuana    Sexual activity: Not on file       FAMILY HISTORY  History reviewed. No pertinent family history.       Objective:     VITAL SIGNS: /88   Pulse 68   Temp 36.7 °C (98.1 °F)   Resp 12   Ht 1.854 m (6' 1\")   Wt 95.3 kg (210 lb)   SpO2 95%   BMI 27.71 kg/m²     PHYSICAL EXAM  Physical Exam  Constitutional:       General: He is not in acute distress.     Appearance: Normal appearance. He is not ill-appearing, toxic-appearing or diaphoretic.   HENT:      Head: Normocephalic and atraumatic.      Right Ear: Tympanic membrane, ear canal and external ear normal.      Left Ear: Tympanic membrane, ear canal and external ear normal.      Nose: Congestion present. No rhinorrhea.      Mouth/Throat:      Mouth: Mucous membranes are moist.      Pharynx: Posterior oropharyngeal erythema present. No " oropharyngeal exudate.   Eyes:      Conjunctiva/sclera: Conjunctivae normal.   Cardiovascular:      Rate and Rhythm: Normal rate and regular rhythm.      Pulses: Normal pulses.      Heart sounds: Normal heart sounds.   Pulmonary:      Effort: Pulmonary effort is normal.      Breath sounds: Normal breath sounds. No wheezing, rhonchi or rales.   Musculoskeletal:      Cervical back: Normal range of motion. No muscular tenderness.   Lymphadenopathy:      Cervical: No cervical adenopathy.   Skin:     General: Skin is warm and dry.      Capillary Refill: Capillary refill takes less than 2 seconds.   Neurological:      Mental Status: He is alert.   Psychiatric:         Mood and Affect: Mood normal.         Thought Content: Thought content normal.     POCT flu: Negative    Assessment/Plan:     1. Upper respiratory tract infection, unspecified type  - POCT Influenza A/B      MDM/Comments:    -Discussed viral etiology of URI.     Symptomatic care.  -Oral hydration and rest.   -Over the counter expectorant as directed; Guaifenesin (Mucinex).  -Diphenhydramine as directed for rhinorrhea (runny nose) and sneezing.  --May take over the counter pseudoephedrine for nasal congestion. Can increase your blood pressure.  -Tylenol or ibuprofen for pain and fever as directed.   -Saline nasal spray as a decongestant.    Follow up for shortness of breath, fevers, elevated heart rate, weakness, prolonged cough, chest pain, or any other concerns.      Differential diagnosis, natural history, supportive care, and indications for immediate follow-up discussed. All questions answered. Patient agrees with the plan of care.    Follow-up as needed if symptoms worsen or fail to improve to PCP, Urgent care or Emergency Room.    I have personally reviewed prior external notes and test results pertinent to today's visit.  I have independently reviewed and interpreted all diagnostics ordered during this urgent care acute visit.   Discussed management  options (risks,benefits, and alternatives to treatment). Pt expresses understanding and the treatment plan was agreed upon. Questions were encouraged and answered to pt's satisfaction.    Please note that this dictation was created using voice recognition software. I have made a reasonable attempt to correct obvious errors, but I expect that there are errors of grammar and possibly content that I did not discover before finalizing the note.

## 2023-01-10 NOTE — LETTER
[facility] [place]  [address] [id number]     January 10, 2023    Patient: Joaquin Donaldson   YOB: 1975   Date of Visit: 1/10/2023       To Whom It May Concern:    Joaquin Donaldson was seen and treated in our department on 1/10/2023.  Excuse patient's absence from work on 1/10/2023.  Cleared to return to work on 1/11/2023.  Call questions or concerns at [phone].    Sincerely,     Christoph Edwards P.A.-C.

## 2023-03-13 ENCOUNTER — OFFICE VISIT (OUTPATIENT)
Dept: URGENT CARE | Facility: PHYSICIAN GROUP | Age: 48
End: 2023-03-13
Payer: COMMERCIAL

## 2023-03-13 VITALS
HEART RATE: 69 BPM | SYSTOLIC BLOOD PRESSURE: 128 MMHG | HEIGHT: 73 IN | BODY MASS INDEX: 27.83 KG/M2 | WEIGHT: 210 LBS | TEMPERATURE: 98.3 F | DIASTOLIC BLOOD PRESSURE: 78 MMHG | RESPIRATION RATE: 18 BRPM | OXYGEN SATURATION: 97 %

## 2023-03-13 DIAGNOSIS — R07.89 LEFT-SIDED CHEST WALL PAIN: ICD-10-CM

## 2023-03-13 PROCEDURE — 99214 OFFICE O/P EST MOD 30 MIN: CPT | Performed by: NURSE PRACTITIONER

## 2023-03-13 PROCEDURE — 93000 ELECTROCARDIOGRAM COMPLETE: CPT | Performed by: NURSE PRACTITIONER

## 2023-03-13 RX ORDER — NAPROXEN 500 MG/1
500 TABLET ORAL 2 TIMES DAILY WITH MEALS
Qty: 60 TABLET | Refills: 0 | Status: SHIPPED | OUTPATIENT
Start: 2023-03-13

## 2023-03-13 ASSESSMENT — ENCOUNTER SYMPTOMS
DIAPHORESIS: 0
COUGH: 0
TINGLING: 0
MYALGIAS: 1
NAUSEA: 0
WHEEZING: 0
CHILLS: 0
FEVER: 0
SHORTNESS OF BREATH: 0
PALPITATIONS: 0
ORTHOPNEA: 0
VOMITING: 0
SENSORY CHANGE: 0

## 2023-03-13 ASSESSMENT — FIBROSIS 4 INDEX: FIB4 SCORE: 1.23

## 2023-03-13 NOTE — PROGRESS NOTES
Subjective     Joaquin Donaldson is a 47 y.o. male who presents with Chest Pain (X 2 days sharp pain in Lft chest. Pt states on and off spasm type muscle pain.)            HPI  New problem.  Patient is a 47-year-old male who presents with left-sided chest pain in the upper part of his chest x3 days.  He reports that this pain has been ongoing for some time however became more consistent over the past 2 days.  He describes his pain as sharp and spasm-like.  He denies any radiation of this pain.  He denies shortness of breath, nausea, or diaphoresis.  He does have a history of having high triglycerides as well as anxiety.  He has not been evaluated for this previously.  Patient has no known allergies.  Current Outpatient Medications on File Prior to Visit   Medication Sig Dispense Refill    omeprazole (PRILOSEC) 20 MG delayed-release capsule Take 20 mg by mouth every day.      albuterol 108 (90 Base) MCG/ACT Aero Soln inhalation aerosol Inhale 2 Puffs every 6 hours as needed for Shortness of Breath. 8.5 g 0    doxycycline (VIBRAMYCIN) 100 MG Tab Take 1 Tablet by mouth 2 times a day. 14 Tablet 0    ALPRAZolam (XANAX) 0.25 MG Tab TAKE 1 TABLET BY MOUTH ONCE DAILY AS NEEDED FOR 30 DAYS      fenofibrate (TRICOR) 54 MG tablet       fluoxetine (PROZAC) 40 MG capsule Take 40 mg by mouth.      tadalafil (CIALIS) 10 MG tablet TAKE ONE-HALF TABLET TO ONE TABLET BY MOUTH 1 HOUR PRIOR TO SEXUAL ACTIVITY.       No current facility-administered medications on file prior to visit.     Breast Cancer-related family history is not on file.  Social History     Socioeconomic History    Marital status:      Spouse name: Not on file    Number of children: Not on file    Years of education: Not on file    Highest education level: Not on file   Occupational History    Not on file   Tobacco Use    Smoking status: Former     Packs/day: 0.25     Types: Cigarettes    Smokeless tobacco: Never   Vaping Use    Vaping Use: Never used  "  Substance and Sexual Activity    Alcohol use: Yes     Alcohol/week: 2.4 oz     Types: 4 Cans of beer per week     Comment: soc    Drug use: Not Currently     Comment: marijuana    Sexual activity: Not on file   Other Topics Concern    Not on file   Social History Narrative    Not on file     Social Determinants of Health     Financial Resource Strain: Not on file   Food Insecurity: Not on file   Transportation Needs: Not on file   Physical Activity: Not on file   Stress: Not on file   Social Connections: Not on file   Intimate Partner Violence: Not on file   Housing Stability: Not on file         Review of Systems   Constitutional:  Negative for chills, diaphoresis and fever.   Respiratory:  Negative for cough, shortness of breath and wheezing.    Cardiovascular:  Positive for chest pain. Negative for palpitations and orthopnea.   Gastrointestinal:  Negative for nausea and vomiting.   Musculoskeletal:  Positive for myalgias.   Neurological:  Negative for tingling and sensory change.            Objective     /78 (BP Location: Left arm, Patient Position: Sitting, BP Cuff Size: Adult)   Pulse 69   Temp 36.8 °C (98.3 °F) (Temporal)   Resp 18   Ht 1.854 m (6' 1\")   Wt 95.3 kg (210 lb)   SpO2 97%   BMI 27.71 kg/m²      Physical Exam  Vitals and nursing note reviewed.   Constitutional:       General: He is not in acute distress.     Appearance: He is well-developed.   HENT:      Head: Normocephalic.      Right Ear: External ear normal.      Left Ear: External ear normal.      Nose: Mucosal edema present. No rhinorrhea.   Eyes:      General:         Right eye: No discharge.         Left eye: No discharge.      Conjunctiva/sclera: Conjunctivae normal.   Cardiovascular:      Rate and Rhythm: Normal rate and regular rhythm.      Heart sounds: Normal heart sounds.   Pulmonary:      Effort: Pulmonary effort is normal.      Breath sounds: Normal breath sounds.   Musculoskeletal:         General: Normal range of " motion.      Cervical back: Normal range of motion and neck supple.   Lymphadenopathy:      Cervical: No cervical adenopathy.   Skin:     General: Skin is warm and dry.   Neurological:      Mental Status: He is alert and oriented to person, place, and time.   Psychiatric:         Behavior: Behavior normal.         Thought Content: Thought content normal.                           Assessment & Plan        1. Left-sided chest wall pain  EKG - Clinic Performed    naproxen (NAPROSYN) 500 MG Tab        Likely musculoskeletal in nature.  His EKG shows sinus rhythm with a rate of 59 with borderline left axis deviation as well as ST elevation that is probable normal early repolarization.  Advised on emergency room precautions and I have given him a prescription for naproxen.  He is advised to schedule follow-up with his primary care.

## 2023-03-13 NOTE — LETTER
March 13, 2023        Joaquin Donaldson  [address] [place] [address]        Joaquin was seen in our clinic today and he is excused from work. Thank you.        If you have any questions or concerns, please don't hesitate to call.        Sincerely,        WILLIAM Mark.P.DEANNE.    Electronically Signed

## 2023-06-20 ENCOUNTER — OFFICE VISIT (OUTPATIENT)
Dept: URGENT CARE | Facility: PHYSICIAN GROUP | Age: 48
End: 2023-06-20
Payer: COMMERCIAL

## 2023-06-20 VITALS
OXYGEN SATURATION: 98 % | BODY MASS INDEX: 25.18 KG/M2 | SYSTOLIC BLOOD PRESSURE: 136 MMHG | HEIGHT: 73 IN | DIASTOLIC BLOOD PRESSURE: 80 MMHG | TEMPERATURE: 98.2 F | RESPIRATION RATE: 14 BRPM | WEIGHT: 190 LBS | HEART RATE: 73 BPM

## 2023-06-20 DIAGNOSIS — R10.9 ABDOMINAL PAIN, UNSPECIFIED ABDOMINAL LOCATION: ICD-10-CM

## 2023-06-20 DIAGNOSIS — R11.2 NAUSEA AND VOMITING, UNSPECIFIED VOMITING TYPE: ICD-10-CM

## 2023-06-20 DIAGNOSIS — R19.7 DIARRHEA, UNSPECIFIED TYPE: ICD-10-CM

## 2023-06-20 PROCEDURE — 3075F SYST BP GE 130 - 139MM HG: CPT | Performed by: PHYSICIAN ASSISTANT

## 2023-06-20 PROCEDURE — 3079F DIAST BP 80-89 MM HG: CPT | Performed by: PHYSICIAN ASSISTANT

## 2023-06-20 PROCEDURE — 99213 OFFICE O/P EST LOW 20 MIN: CPT | Performed by: PHYSICIAN ASSISTANT

## 2023-06-20 RX ORDER — ONDANSETRON 4 MG/1
4 TABLET, ORALLY DISINTEGRATING ORAL EVERY 6 HOURS PRN
Qty: 15 TABLET | Refills: 0 | Status: SHIPPED | OUTPATIENT
Start: 2023-06-20 | End: 2023-08-08

## 2023-06-20 ASSESSMENT — FIBROSIS 4 INDEX: FIB4 SCORE: 1.23

## 2023-06-20 NOTE — LETTER
June 20, 2023         Patient: Joaquin Donaldson   YOB: 1975   Date of Visit: 6/20/2023           To Whom it May Concern:    Joaquin Donaldson was seen in my clinic on 6/20/2023.  Please excuse from work 6/19 through 6/21    If you have any questions or concerns, please don't hesitate to call.        Sincerely,           Lee Guillen P.A.-C.  Electronically Signed

## 2023-06-20 NOTE — PROGRESS NOTES
"Subjective:   Joaquin Donaldson is a 47 y.o. male who presents for Diarrhea (X3 days; diarrhea and can't hold anything down. Bad headache, woke up today with it pounding. Haven't thrown up other than this morning. )      HPI  The patient presents to the Urgent Care with complaints of diarrhea onset 2 days ago.  He did have 1 episode of vomiting a couple days ago.  No vomiting since.  Still has nausea.  Diarrhea described as watery stool about 8 times per day.  Did notice some dark/grayish-black stool 2 days ago but none since. Shortly before onset, he did drink a bottle of multivitamin sports drink that did contain Iron supplementation.   Abdominal discomfort to lower abdomen. Left side worse. Sharp.   Denies any fever. Tolerating fluids but not drinking as much. No pain after eating. No recent traveling or drinking out of unknown water sources. History of hernia repair x 2. Drinks about 2-3 beers per day and will take shots of hard alcohol every now and then.       Past Medical History:   Diagnosis Date    Bronchitis 02/2020    GERD (gastroesophageal reflux disease)     Psychiatric problem     depression and anxiety     No Known Allergies     Objective:     /80   Pulse 73   Temp 36.8 °C (98.2 °F)   Resp 14   Ht 1.854 m (6' 1\")   Wt 86.2 kg (190 lb)   SpO2 98%   BMI 25.07 kg/m²     Physical Exam  Vitals reviewed.   Constitutional:       General: He is not in acute distress.     Appearance: Normal appearance. He is not ill-appearing or toxic-appearing.   HENT:      Mouth/Throat:      Mouth: Mucous membranes are dry.      Pharynx: Oropharynx is clear.   Eyes:      Conjunctiva/sclera: Conjunctivae normal.   Cardiovascular:      Rate and Rhythm: Normal rate and regular rhythm.      Heart sounds: Normal heart sounds.   Pulmonary:      Effort: Pulmonary effort is normal.      Breath sounds: Normal breath sounds.   Abdominal:      General: Abdomen is flat. Bowel sounds are increased. There is no " distension.      Palpations: There is no hepatomegaly, splenomegaly or mass.      Tenderness: There is abdominal tenderness (mild) in the epigastric area, left upper quadrant and left lower quadrant.   Musculoskeletal:      Cervical back: Neck supple.   Skin:     General: Skin is warm and dry.   Neurological:      General: No focal deficit present.      Mental Status: He is alert and oriented to person, place, and time.   Psychiatric:         Mood and Affect: Mood normal.         Behavior: Behavior normal.         Diagnosis and associated orders:     1. Diarrhea, unspecified type    2. Nausea and vomiting, unspecified vomiting type  - ondansetron (ZOFRAN ODT) 4 MG TABLET DISPERSIBLE; Take 1 Tablet by mouth every 6 hours as needed for Nausea/Vomiting.  Dispense: 15 Tablet; Refill: 0    3. Abdominal pain, unspecified abdominal location       Comments/MDM:     The patient's presenting symptoms and exam findings are most likely consistent with a viral gastroenteritis. Primary symptom is diarrhea. Mild abdominal tenderness on exam without rebound or guarding. Patient overall is well-appearing in no acute distress.  Normal vital signs.  Tolerating fluids but has not been drinking as much as he should. Discussed options of further evaluation such as labs to evaluate for bacterial infection or pancreatitis. Patient would like to to manage with symptomatically and supportively first and closely monitor.   Zofran for nausea. Recommend increase fluid intake such as sips of fluids and Pedialyte, bland or BRAT diet and increase as tolerated. Avoid dairy of fatty foods for now. If no improvement in 5 to 7 days or any worsening, recommend returning to the urgent care or following up with PCP for re-evaluation.  Patient understands to present immediately to the ER if any severe abdominal pain, unable to tolerate fluids, or any other concerns.    Work note provided as requested.        I personally reviewed prior external notes and  test results pertinent to today's visit. Pathogenesis of diagnosis discussed including typical length and natural progression. Supportive care, natural history, differential diagnoses, and indications for immediate follow-up discussed. Patient expresses understanding and agrees to plan. Patient denies any other questions or concerns.     Follow-up with the primary care physician for recheck, reevaluation, and consideration of further management.    Please note that this dictation was created using voice recognition software. I have made a reasonable attempt to correct obvious errors, but I expect that there are errors of grammar and possibly content that I did not discover before finalizing the note.    This note was electronically signed by Lee Guillen PA-C

## 2023-08-08 ENCOUNTER — OFFICE VISIT (OUTPATIENT)
Dept: URGENT CARE | Facility: PHYSICIAN GROUP | Age: 48
End: 2023-08-08
Payer: COMMERCIAL

## 2023-08-08 VITALS
SYSTOLIC BLOOD PRESSURE: 126 MMHG | BODY MASS INDEX: 27.7 KG/M2 | HEART RATE: 72 BPM | HEIGHT: 73 IN | DIASTOLIC BLOOD PRESSURE: 66 MMHG | WEIGHT: 209 LBS | OXYGEN SATURATION: 95 % | RESPIRATION RATE: 18 BRPM | TEMPERATURE: 97.2 F

## 2023-08-08 DIAGNOSIS — R19.7 DIARRHEA, UNSPECIFIED TYPE: ICD-10-CM

## 2023-08-08 DIAGNOSIS — R11.2 NAUSEA AND VOMITING, UNSPECIFIED VOMITING TYPE: ICD-10-CM

## 2023-08-08 PROCEDURE — 99214 OFFICE O/P EST MOD 30 MIN: CPT | Performed by: NURSE PRACTITIONER

## 2023-08-08 PROCEDURE — 3074F SYST BP LT 130 MM HG: CPT | Performed by: NURSE PRACTITIONER

## 2023-08-08 PROCEDURE — 3078F DIAST BP <80 MM HG: CPT | Performed by: NURSE PRACTITIONER

## 2023-08-08 RX ORDER — ONDANSETRON 4 MG/1
4 TABLET, ORALLY DISINTEGRATING ORAL EVERY 8 HOURS PRN
Qty: 20 TABLET | Refills: 0 | Status: SHIPPED | OUTPATIENT
Start: 2023-08-08

## 2023-08-08 RX ORDER — ONDANSETRON 4 MG/1
4 TABLET, ORALLY DISINTEGRATING ORAL ONCE
Status: COMPLETED | OUTPATIENT
Start: 2023-08-08 | End: 2023-08-08

## 2023-08-08 RX ADMIN — ONDANSETRON 4 MG: 4 TABLET, ORALLY DISINTEGRATING ORAL at 13:47

## 2023-08-08 ASSESSMENT — FIBROSIS 4 INDEX: FIB4 SCORE: 1.23

## 2023-08-08 NOTE — PROGRESS NOTES
"Joaquin Donaldson is a 47 y.o. male who presents for Emesis (X 2 days nausea, diarrhea, vomiting, stomach pain, chills, night sweats)      HPI  This is a new problem. Joaquin Donaldson is a 47 y.o. patient who presents to urgent care with c/o: 3 days of vomiting and diarrhea. Stomach hurts. Having subjective temperatures. Keeping water down. Harder time keeping food down. Ate chili dog at a vendor the other day prior to onset of his symptoms.   No blood in vomit or stool.  Having 5 liquid stools/ day avg. Only threw up once in the past 24 hours. Urinating normally. Treatment tried: tylenol. No other aggravating or alleviating factors.  Hx of GI problems - not sure what the name was but he never went back for more evaluation ( colitis? IBS? \"Something\").   No other aggravating or alleviating factors.             ROS See HPI    Allergies:     No Known Allergies    PMSFS Hx:  Past Medical History:   Diagnosis Date    Bronchitis 02/2020    GERD (gastroesophageal reflux disease)     Psychiatric problem     depression and anxiety     Past Surgical History:   Procedure Laterality Date    INGUINAL HERNIA REPAIR ROBOTIC XI Bilateral 7/23/2020    Procedure: REPAIR, HERNIA, INGUINAL, ROBOT-ASSISTED, USING DA ADALGISA XI-WITH MESH;  Surgeon: Radha Cruz M.D.;  Location: SURGERY [facility];  Service: Gen Robotic    HERNIA REPAIR  1995    KNEE ARTHROSCOPY Right      History reviewed. No pertinent family history.  Social History     Tobacco Use    Smoking status: Former     Packs/day: 0.25     Types: Cigarettes    Smokeless tobacco: Never   Substance Use Topics    Alcohol use: Yes     Alcohol/week: 2.4 oz     Types: 4 Cans of beer per week     Comment: soc       Problems:   Patient Active Problem List   Diagnosis    Marijuana smoker    Tobacco dependency    Smoker    Bilateral inguinal hernia without obstruction or gangrene    Chest pain       Medications:   Current Outpatient Medications on File Prior to " "Visit   Medication Sig Dispense Refill    naproxen (NAPROSYN) 500 MG Tab Take 1 Tablet by mouth 2 times a day with meals. 60 Tablet 0    omeprazole (PRILOSEC) 20 MG delayed-release capsule Take 20 mg by mouth every day.      albuterol 108 (90 Base) MCG/ACT Aero Soln inhalation aerosol Inhale 2 Puffs every 6 hours as needed for Shortness of Breath. 8.5 g 0    ALPRAZolam (XANAX) 0.25 MG Tab TAKE 1 TABLET BY MOUTH ONCE DAILY AS NEEDED FOR 30 DAYS      fenofibrate (TRICOR) 54 MG tablet       tadalafil (CIALIS) 10 MG tablet TAKE ONE-HALF TABLET TO ONE TABLET BY MOUTH 1 HOUR PRIOR TO SEXUAL ACTIVITY.      fluoxetine (PROZAC) 40 MG capsule Take 40 mg by mouth.       No current facility-administered medications on file prior to visit.          Objective:     /66 (BP Location: Left arm, Patient Position: Sitting, BP Cuff Size: Adult)   Pulse 72   Temp 36.2 °C (97.2 °F) (Temporal)   Resp 18   Ht 1.854 m (6' 1\")   Wt 94.8 kg (209 lb)   SpO2 95%   BMI 27.57 kg/m²     Physical Exam  Vitals and nursing note reviewed.   Constitutional:       General: He is not in acute distress.     Appearance: Normal appearance. He is well-developed. He is ill-appearing. He is not toxic-appearing.   HENT:      Head: Normocephalic.      Mouth/Throat:      Mouth: Mucous membranes are moist.   Cardiovascular:      Rate and Rhythm: Normal rate.      Pulses: Normal pulses.   Pulmonary:      Effort: Pulmonary effort is normal.   Abdominal:      Palpations: Abdomen is soft.      Tenderness: There is generalized abdominal tenderness. There is no right CVA tenderness, left CVA tenderness, guarding or rebound. Negative signs include Chris's sign, McBurney's sign and obturator sign.   Musculoskeletal:         General: Normal range of motion.      Cervical back: Neck supple.   Skin:     General: Skin is warm and dry.   Neurological:      Mental Status: He is alert and oriented to person, place, and time.      Deep Tendon Reflexes: Reflexes are " normal and symmetric.   Psychiatric:         Mood and Affect: Mood normal.         Behavior: Behavior normal. Behavior is cooperative.         Thought Content: Thought content normal.           Assessment /Associated Orders:      1. Nausea and vomiting, unspecified vomiting type  ondansetron (Zofran ODT) dispertab 4 mg    ondansetron (ZOFRAN ODT) 4 MG TABLET DISPERSIBLE      2. Diarrhea, unspecified type              Medical Decision Making:      Pt is clinically stable at today's acute urgent care visit.  No acute distress noted.  VSS. Appropriate for outpatient care at this time.   Acute problem today with uncertain prognosis.       Educated in proper administration of  prescription medication(s) ordered today including safety, possible SE, risks, benefits, rationale and alternatives to therapy.   Work note   Discussed  the etiology and pathophysiology of gastroenteritis.  This is a viral illness.   If vomiting may start with clear liquid diet for 24 hours taking small sips very frequently.  May use pedialyte, Gatorade, ginger ale, or sprite.  After 24 hours and vomiting has subsided may start a bland diet such as bananas, rice, applesauce, toast, crackers, mashed potatoes, chicken noodle soup, cream of wheat.   Consider probiotics.   Recommend FU with gastroenterology and PCP.   Go to ER for signs of dehydration or can't keep small sips down. Discussed s/s of dehydration including dry sticky mouth, no urine in 8 hrs.  Return to clinic if fever greater than 5 days, bloody vomit or diarrhea, diarrhea greater than 10 days, vomiting greater than 3 days.       Discussed Dx, management options (risks,benefits, and alternatives to planned treatment), natural progression and supportive care.  Expressed understanding and the treatment plan was agreed upon.   Questions were encouraged and answered   Return to urgent care prn if new or worsening sx or if there is no improvement in condition prn.          Time I spent  evaluating Joaquin Donaldson in urgent care today was 30  minutes. This time includes preparing for visit, reviewing any pertinent notes or test results, counseling/education, exam, obtaining HPI, interpretation of lab tests, medication management and documentation as indicated above.Time does not include separately billable procedures noted .       Please note that this dictation was created using voice recognition software. I have worked with consultants from the vendor as well as technical experts from [facility] to optimize the interface. I have made every reasonable attempt to correct obvious errors, but I expect that there are errors of grammar and possibly content that I did not discover before finalizing the note.  This note was electronically signed by provider

## 2023-08-08 NOTE — LETTER
August 8, 2023       Patient: Joaquin Donaldson   YOB: 1975   Date of Visit: 8/8/2023         To Whom It May Concern:    In my medical opinion, I recommend that Joaquin Donaldson return to full duty, no restrictions on 08/12/23              Sincerely,          AINSLEY Kaminski.  Electronically Signed

## 2023-11-27 NOTE — ANESTHESIA QCDR
2019 [facility] Clinical Data Registry (for Quality Improvement)     Postoperative nausea/vomiting risk protocol (Adult = 18 yrs and Pediatric 3-17 yrs)- (430 and 463)  General inhalation anesthetic (NOT TIVA) with PONV risk factors: No  Provision of anti-emetic therapy with at least 2 different classes of agents: N/A  Patient DID NOT receive anti-emetic therapy and reason is documented in Medical Record: N/A    Multimodal Pain Management- (477)  Non-emergent surgery AND patient age >= 18: Yes  Use of Multimodal Pain Management, two or more drugs and/or interventions, NOT including systemic opioids: Yes  Exception: Documented allergy to multiple classes of analgesics: N/A    Smoking Abstinence (404)  Patient is current smoker (cigarette, pipe, e-cig, marijuanna): Yes  Elective Surgery: Yes  Abstinence instructions provided prior to day of surgery: Yes  Patient abstained from smoking on day of surgery: Yes    Pre-Op Beta-Blocker in Isolated CABG (44)  Isolated CABG AND patient age >= 18: No  Beta-blocker admin within 24 hours of surgical incision:   Exception:of medical reason(s) for not administering beta blocker within 24 hours prior to surgical incision (e.g., not  indicated,other medical reason):     PACU assessment of acute postoperative pain prior to Anesthesia Care End- Applies to Patients Age = 18- (ABG7)  Initial PACU pain score is which of the following: < 7/10  Patient unable to report pain score: N/A    Post-anesthetic transfer of care checklist/protocol to PACU/ICU- (426 and 427)  Upon conclusion of case, patient transferred to which of the following locations: PACU/Non-ICU  Use of transfer checklist/protocol: Yes  Exclusion: Service Performed in Patient Hospital Room (and thus did not require transfer): N/A  Unplanned admission to ICU related to anesthesia service up through end of PACU care- (MD51)  Unplanned admission to ICU (not initially anticipated at anesthesia start time): No          28-Nov-2023 10:27

## 2023-12-19 ENCOUNTER — OFFICE VISIT (OUTPATIENT)
Dept: URGENT CARE | Facility: PHYSICIAN GROUP | Age: 48
End: 2023-12-19
Payer: COMMERCIAL

## 2023-12-19 VITALS
HEIGHT: 73 IN | DIASTOLIC BLOOD PRESSURE: 70 MMHG | WEIGHT: 210 LBS | BODY MASS INDEX: 27.83 KG/M2 | OXYGEN SATURATION: 98 % | RESPIRATION RATE: 20 BRPM | TEMPERATURE: 98.7 F | SYSTOLIC BLOOD PRESSURE: 118 MMHG | HEART RATE: 69 BPM

## 2023-12-19 DIAGNOSIS — R05.1 ACUTE COUGH: ICD-10-CM

## 2023-12-19 DIAGNOSIS — R09.81 NASAL CONGESTION: ICD-10-CM

## 2023-12-19 DIAGNOSIS — J06.9 UPPER RESPIRATORY INFECTION, ACUTE: ICD-10-CM

## 2023-12-19 PROCEDURE — 99213 OFFICE O/P EST LOW 20 MIN: CPT

## 2023-12-19 PROCEDURE — 3078F DIAST BP <80 MM HG: CPT

## 2023-12-19 PROCEDURE — 3074F SYST BP LT 130 MM HG: CPT

## 2023-12-19 RX ORDER — BENZONATATE 100 MG/1
100 CAPSULE ORAL 3 TIMES DAILY PRN
Qty: 30 CAPSULE | Refills: 0 | Status: SHIPPED | OUTPATIENT
Start: 2023-12-19 | End: 2024-03-13

## 2023-12-19 RX ORDER — PREDNISONE 20 MG/1
20 TABLET ORAL 2 TIMES DAILY
Qty: 6 TABLET | Refills: 0 | Status: SHIPPED | OUTPATIENT
Start: 2023-12-19 | End: 2023-12-22

## 2023-12-19 RX ORDER — FLUTICASONE PROPIONATE 50 MCG
1 SPRAY, SUSPENSION (ML) NASAL DAILY
Qty: 16 G | Refills: 0 | Status: SHIPPED | OUTPATIENT
Start: 2023-12-19

## 2023-12-19 ASSESSMENT — ENCOUNTER SYMPTOMS
CHILLS: 1
ABDOMINAL PAIN: 0
SORE THROAT: 1
COUGH: 1
VOMITING: 0
NAUSEA: 0
SHORTNESS OF BREATH: 1
MYALGIAS: 1
CARDIOVASCULAR NEGATIVE: 1
SINUS PAIN: 0
NEUROLOGICAL NEGATIVE: 1
FEVER: 1

## 2023-12-19 ASSESSMENT — FIBROSIS 4 INDEX: FIB4 SCORE: 1.25

## 2023-12-19 NOTE — LETTER
[facility]  [address] [facility] [address] [facility] [address]     December 19, 2023    Patient: Joaquin Donaldson   YOB: 1975   Date of Visit: 12/19/2023       To Whom It May Concern:    Joaquin Donaldson was seen and treated in our department on 12/19/2023. Please excuse 12/18 and 12/20.       Sincerely,     CARMELINA Acevedo.

## 2023-12-19 NOTE — PROGRESS NOTES
Chief Complaint   Patient presents with    Cough     Body aches, fatigue, sweating, vertigo X 5 days       HISTORY OF PRESENT ILLNESS: Patient is a pleasant 48 y.o. male who presents to urgent care today flulike symptoms for the last 5 days.  Patient has no chronic conditions, he does not smoke, he has not taken anything for this.    Patient Active Problem List    Diagnosis Date Noted    Chest pain 09/03/2021    Marijuana smoker 07/23/2020    Tobacco dependency 07/23/2020    Smoker 07/23/2020    Bilateral inguinal hernia without obstruction or gangrene 07/23/2020       Allergies:Patient has no known allergies.    Current Outpatient Medications Ordered in Epic   Medication Sig Dispense Refill    fluticasone (FLONASE) 50 MCG/ACT nasal spray Administer 1 Spray into affected nostril(S) every day. 16 g 0    benzonatate (TESSALON) 100 MG Cap Take 1 Capsule by mouth 3 times a day as needed for Cough. 30 Capsule 0    predniSONE (DELTASONE) 20 MG Tab Take 1 Tablet by mouth 2 times a day for 3 days. 6 Tablet 0    ondansetron (ZOFRAN ODT) 4 MG TABLET DISPERSIBLE Take 1 Tablet by mouth every 8 hours as needed for Nausea/Vomiting. 20 Tablet 0    naproxen (NAPROSYN) 500 MG Tab Take 1 Tablet by mouth 2 times a day with meals. 60 Tablet 0    omeprazole (PRILOSEC) 20 MG delayed-release capsule Take 20 mg by mouth every day.      albuterol 108 (90 Base) MCG/ACT Aero Soln inhalation aerosol Inhale 2 Puffs every 6 hours as needed for Shortness of Breath. 8.5 g 0    ALPRAZolam (XANAX) 0.25 MG Tab TAKE 1 TABLET BY MOUTH ONCE DAILY AS NEEDED FOR 30 DAYS      tadalafil (CIALIS) 10 MG tablet TAKE ONE-HALF TABLET TO ONE TABLET BY MOUTH 1 HOUR PRIOR TO SEXUAL ACTIVITY.      fluoxetine (PROZAC) 40 MG capsule Take 40 mg by mouth.      fenofibrate (TRICOR) 54 MG tablet  (Patient not taking: Reported on 12/19/2023)       No current [facility]-ordered facility-administered medications on file.       Past Medical History:   Diagnosis Date    Bronchitis  "02/2020    GERD (gastroesophageal reflux disease)     Psychiatric problem     depression and anxiety       Social History     Tobacco Use    Smoking status: Former     Current packs/day: 0.25     Types: Cigarettes    Smokeless tobacco: Never   Vaping Use    Vaping Use: Never used   Substance Use Topics    Alcohol use: Yes     Alcohol/week: 2.4 oz     Types: 4 Cans of beer per week     Comment: soc    Drug use: Not Currently     Comment: marijuana       No family status information on file.   History reviewed. No pertinent family history.    Review of Systems   Constitutional:  Positive for chills, fever and malaise/fatigue.   HENT:  Positive for congestion and sore throat. Negative for ear pain and sinus pain.    Respiratory:  Positive for cough and shortness of breath.    Cardiovascular: Negative.    Gastrointestinal:  Negative for abdominal pain, nausea and vomiting.   Musculoskeletal:  Positive for myalgias.   Neurological: Negative.        Exam:  /70 (BP Location: Right arm, Patient Position: Sitting, BP Cuff Size: Adult)   Pulse 69   Temp 37.1 °C (98.7 °F) (Temporal)   Resp 20   Ht 1.854 m (6' 1\")   Wt 95.3 kg (210 lb)   SpO2 98%   Physical Exam  Constitutional:       Appearance: Normal appearance.   HENT:      Head: Normocephalic.      Right Ear: Tympanic membrane and ear canal normal. There is no impacted cerumen. Tympanic membrane is not injected or erythematous.      Left Ear: Tympanic membrane and ear canal normal. There is no impacted cerumen. Tympanic membrane is not injected or erythematous.      Nose: Congestion and rhinorrhea present. Rhinorrhea is clear.      Mouth/Throat:      Mouth: Mucous membranes are moist.      Pharynx: Oropharynx is clear. Posterior oropharyngeal erythema present. No oropharyngeal exudate.      Tonsils: No tonsillar exudate. 0 on the right. 0 on the left.   Eyes:      General:         Right eye: No discharge.         Left eye: No discharge.      Extraocular " Movements: Extraocular movements intact.      Conjunctiva/sclera: Conjunctivae normal.      Pupils: Pupils are equal, round, and reactive to light.   Cardiovascular:      Rate and Rhythm: Normal rate and regular rhythm.      Pulses: Normal pulses.      Heart sounds: Normal heart sounds. No murmur heard.  Pulmonary:      Effort: Pulmonary effort is normal. No respiratory distress.      Breath sounds: Normal breath sounds. No stridor. No wheezing or rhonchi.   Chest:      Chest wall: No tenderness.   Musculoskeletal:         General: No swelling, tenderness, deformity or signs of injury. Normal range of motion.      Cervical back: Normal range of motion.      Right lower leg: No edema.      Left lower leg: No edema.   Lymphadenopathy:      Cervical: No cervical adenopathy.   Skin:     General: Skin is warm and dry.      Capillary Refill: Capillary refill takes 2 to 3 seconds.      Findings: No bruising or rash.   Neurological:      General: No focal deficit present.      Mental Status: He is alert.      Sensory: No sensory deficit.      Motor: No weakness.      Coordination: Coordination normal.      Gait: Gait normal.   Psychiatric:         Mood and Affect: Mood normal.         Behavior: Behavior normal.         Thought Content: Thought content normal.         Judgment: Judgment normal.         Assessment/Plan:  1. Upper respiratory infection, acute  - fluticasone (FLONASE) 50 MCG/ACT nasal spray; Administer 1 Spray into affected nostril(S) every day.  Dispense: 16 g; Refill: 0  - benzonatate (TESSALON) 100 MG Cap; Take 1 Capsule by mouth 3 times a day as needed for Cough.  Dispense: 30 Capsule; Refill: 0  - predniSONE (DELTASONE) 20 MG Tab; Take 1 Tablet by mouth 2 times a day for 3 days.  Dispense: 6 Tablet; Refill: 0    2. Acute cough    3. Nasal congestion  Patient comes in today with symptoms of flu/cold-like symptoms for the last 5 days.  Symptoms include nasal congestion, cough, body aches and chills.  They  have taken nothing.  Patient has no major previous medical history.  On physical exam patient has noted clear nasal drainage, lung sounds are clear to auscultation, vitals appear within normal limits today.  Patient given Flonase, Tessalon, prednisone for comfort measures.  He is out past the limit for flu swab and treatment.  Encouraged the use of fluids, Motrin and Tylenol, vitamin C, D and zinc.  Patient is aware of the plan of care and agreeable at this time, advised to follow-up if they continue to get worse or do not improve.    Supportive care, differential diagnoses, and indications for immediate follow-up discussed with patient.   Pathogenesis of diagnosis discussed including typical length and natural progression.   Instructed to return to clinic or nearest emergency department for any change in condition, further concerns, or worsening of symptoms.  Patient states understanding of the plan of care and discharge instructions.  Instructed to make an appointment, for follow up, with primary care provider.      Please note that this dictation was created using voice recognition software. I have made every reasonable attempt to correct obvious errors, but I expect that there are errors of grammar and possibly content that I did not discover before finalizing the note.      Jennifer FLORES

## 2024-03-13 ENCOUNTER — OFFICE VISIT (OUTPATIENT)
Dept: URGENT CARE | Facility: PHYSICIAN GROUP | Age: 49
End: 2024-03-13
Payer: COMMERCIAL

## 2024-03-13 VITALS
TEMPERATURE: 97.4 F | RESPIRATION RATE: 14 BRPM | HEIGHT: 73 IN | WEIGHT: 207 LBS | BODY MASS INDEX: 27.43 KG/M2 | HEART RATE: 70 BPM | OXYGEN SATURATION: 98 % | SYSTOLIC BLOOD PRESSURE: 110 MMHG | DIASTOLIC BLOOD PRESSURE: 70 MMHG

## 2024-03-13 DIAGNOSIS — R05.1 ACUTE COUGH: ICD-10-CM

## 2024-03-13 DIAGNOSIS — R11.0 NAUSEA: ICD-10-CM

## 2024-03-13 DIAGNOSIS — R68.83 CHILLS: ICD-10-CM

## 2024-03-13 DIAGNOSIS — U07.1 COVID-19: ICD-10-CM

## 2024-03-13 LAB
FLUAV RNA SPEC QL NAA+PROBE: NEGATIVE
FLUBV RNA SPEC QL NAA+PROBE: NEGATIVE
RSV RNA SPEC QL NAA+PROBE: NEGATIVE
SARS-COV-2 RNA RESP QL NAA+PROBE: POSITIVE

## 2024-03-13 PROCEDURE — 3078F DIAST BP <80 MM HG: CPT | Performed by: FAMILY MEDICINE

## 2024-03-13 PROCEDURE — 99213 OFFICE O/P EST LOW 20 MIN: CPT | Performed by: FAMILY MEDICINE

## 2024-03-13 PROCEDURE — 0241U POCT CEPHEID COV-2, FLU A/B, RSV - PCR: CPT | Performed by: FAMILY MEDICINE

## 2024-03-13 PROCEDURE — 3074F SYST BP LT 130 MM HG: CPT | Performed by: FAMILY MEDICINE

## 2024-03-13 RX ORDER — DEXTROMETHORPHAN HYDROBROMIDE AND PROMETHAZINE HYDROCHLORIDE 15; 6.25 MG/5ML; MG/5ML
5 SYRUP ORAL 4 TIMES DAILY PRN
Qty: 120 ML | Refills: 0 | Status: SHIPPED | OUTPATIENT
Start: 2024-03-13

## 2024-03-13 RX ORDER — ONDANSETRON 4 MG/1
4 TABLET, ORALLY DISINTEGRATING ORAL EVERY 8 HOURS PRN
Qty: 6 TABLET | Refills: 0 | Status: SHIPPED | OUTPATIENT
Start: 2024-03-13

## 2024-03-13 ASSESSMENT — ENCOUNTER SYMPTOMS
EYE DISCHARGE: 0
EYE REDNESS: 0
WEIGHT LOSS: 0
MYALGIAS: 1

## 2024-03-13 ASSESSMENT — FIBROSIS 4 INDEX: FIB4 SCORE: 1.25

## 2024-03-13 NOTE — LETTER
March 13, 2024         Patient: Joaquin Donaldson   YOB: 1975   Date of Visit: 3/13/2024           To Whom it May Concern:    Joaquin Donaldson was seen in my clinic on 3/13/2024. Please excuse from work 3/12 through 3/14/2024.       Sincerely,           Ortiz Alexandre M.D.  Electronically Signed

## 2024-03-13 NOTE — PROGRESS NOTES
"Subjective     Joaquin Donaldson is a 48 y.o. male who presents with Fever, Body Aches (Started about 3 days ago. ), Diarrhea (Everything just passes right threw him he stated ), Vomiting (Only vomited 1 time and that was last night ), and Cough (For 3 days )            Onset yesterday nasal congestion, loss of smell. Productive cough without blood in sputum. Subjective fever/chills. NVD 1 episode each yesterday.  No shortness of breath or wheezing.  No PMH asthma or pneumonia.  No other aggravating or alleviating factors.          Review of Systems   Constitutional:  Positive for malaise/fatigue. Negative for weight loss.   Eyes:  Negative for discharge and redness.   Musculoskeletal:  Positive for myalgias. Negative for joint pain.   Skin:  Negative for itching and rash.              Objective     /70   Pulse 70   Temp 36.3 °C (97.4 °F) (Temporal)   Resp 14   Ht 1.854 m (6' 1\")   Wt 93.9 kg (207 lb)   SpO2 98%   BMI 27.31 kg/m²      Physical Exam  Constitutional:       General: He is not in acute distress.     Appearance: He is well-developed.   HENT:      Head: Normocephalic and atraumatic.      Right Ear: Tympanic membrane normal.      Left Ear: Tympanic membrane normal.      Nose: Congestion (mild) present.      Mouth/Throat:      Mouth: Mucous membranes are moist.      Pharynx: Posterior oropharyngeal erythema present.   Eyes:      Conjunctiva/sclera: Conjunctivae normal.   Cardiovascular:      Rate and Rhythm: Normal rate and regular rhythm.      Heart sounds: Normal heart sounds. No murmur heard.  Pulmonary:      Effort: Pulmonary effort is normal.      Breath sounds: Normal breath sounds. No wheezing.   Abdominal:      Palpations: Abdomen is soft.      Tenderness: There is no abdominal tenderness.   Musculoskeletal:      Cervical back: Neck supple.   Lymphadenopathy:      Cervical: No cervical adenopathy.   Skin:     General: Skin is warm and dry.      Findings: No rash.   Neurological: "      Mental Status: He is alert.                             Assessment & Plan      Poct pcr c19 +    1. Acute cough  promethazine-dextromethorphan (PROMETHAZINE-DM) 6.25-15 MG/5ML syrup    POCT CEPHEID COV-2, FLU A/B, RSV - PCR      2. Chills  POCT CEPHEID COV-2, FLU A/B, RSV - PCR      3. Nausea  ondansetron (ZOFRAN ODT) 4 MG TABLET DISPERSIBLE    POCT CEPHEID COV-2, FLU A/B, RSV - PCR      4. COVID-19              Differential diagnosis, natural history, supportive care, and indications for immediate follow-up were discussed.     F/u studies

## 2024-05-18 ENCOUNTER — APPOINTMENT (OUTPATIENT)
Dept: RADIOLOGY | Facility: MEDICAL CENTER | Age: 49
End: 2024-05-18
Attending: EMERGENCY MEDICINE
Payer: COMMERCIAL

## 2024-05-18 ENCOUNTER — HOSPITAL ENCOUNTER (EMERGENCY)
Facility: MEDICAL CENTER | Age: 49
End: 2024-05-18
Attending: EMERGENCY MEDICINE
Payer: COMMERCIAL

## 2024-05-18 VITALS
TEMPERATURE: 97.5 F | DIASTOLIC BLOOD PRESSURE: 81 MMHG | RESPIRATION RATE: 20 BRPM | BODY MASS INDEX: 27.83 KG/M2 | SYSTOLIC BLOOD PRESSURE: 131 MMHG | HEIGHT: 73 IN | OXYGEN SATURATION: 94 % | WEIGHT: 210 LBS | HEART RATE: 81 BPM

## 2024-05-18 DIAGNOSIS — F10.920 ALCOHOLIC INTOXICATION WITHOUT COMPLICATION (HCC): ICD-10-CM
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
## 2024-05-18 DIAGNOSIS — S40.022A ARM CONTUSION, LEFT, INITIAL ENCOUNTER: ICD-10-CM

## 2024-05-18 DIAGNOSIS — S02.2XXA CLOSED FRACTURE OF NASAL BONE, INITIAL ENCOUNTER: ICD-10-CM

## 2024-05-18 DIAGNOSIS — S00.83XA CONTUSION OF FACE, INITIAL ENCOUNTER: ICD-10-CM

## 2024-05-18 RX ORDER — ACETAMINOPHEN 500 MG
1000 TABLET ORAL ONCE
Status: COMPLETED | OUTPATIENT
Start: 2024-05-18 | End: 2024-05-18

## 2024-05-18 RX ADMIN — ACETAMINOPHEN 1000 MG: 500 TABLET, FILM COATED ORAL at 01:24

## 2024-05-18 ASSESSMENT — FIBROSIS 4 INDEX: FIB4 SCORE: 1.25
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
## 2024-05-18 NOTE — ED NOTES
"Pt discharged home with wife and son. IV discontinued and gauze placed, pt in possession of belongings. Pt provided discharge education and information pertaining to medications and follow up appointments. Pt received copy of discharge instructions and verbalized understanding. Discussed signs and symptoms to return to the ER, patient verbalized understanding. Pt WC to the lobby with family and assisted into car.      /81   Pulse 81   Temp 36.4 °C (97.5 °F) (Temporal)   Resp 20   Ht 1.854 m (6' 1\")   Wt 95.3 kg (210 lb)   SpO2 94%   BMI 27.71 kg/m²    "
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
## 2024-05-22 ENCOUNTER — HOSPITAL ENCOUNTER (OUTPATIENT)
Dept: RADIOLOGY | Facility: MEDICAL CENTER | Age: 49
End: 2024-05-22
Attending: NURSE PRACTITIONER
Payer: COMMERCIAL

## 2024-05-22 DIAGNOSIS — M79.601 RIGHT ARM PAIN: ICD-10-CM

## 2024-07-11 PROBLEM — S29.011A PECTORALIS MUSCLE RUPTURE: Status: ACTIVE | Noted: 2024-07-11

## 2025-03-31 ENCOUNTER — RESULTS FOLLOW-UP (OUTPATIENT)
Dept: URGENT CARE | Facility: PHYSICIAN GROUP | Age: 50
End: 2025-03-31

## 2025-03-31 ENCOUNTER — OFFICE VISIT (OUTPATIENT)
Dept: URGENT CARE | Facility: PHYSICIAN GROUP | Age: 50
End: 2025-03-31
Payer: COMMERCIAL

## 2025-03-31 VITALS
OXYGEN SATURATION: 96 % | TEMPERATURE: 98.5 F | DIASTOLIC BLOOD PRESSURE: 62 MMHG | HEART RATE: 80 BPM | WEIGHT: 225 LBS | RESPIRATION RATE: 18 BRPM | HEIGHT: 73 IN | SYSTOLIC BLOOD PRESSURE: 124 MMHG | BODY MASS INDEX: 29.82 KG/M2

## 2025-03-31 DIAGNOSIS — R06.02 SHORTNESS OF BREATH: ICD-10-CM

## 2025-03-31 DIAGNOSIS — R05.1 ACUTE COUGH: ICD-10-CM

## 2025-03-31 LAB
FLUAV RNA SPEC QL NAA+PROBE: NEGATIVE
FLUBV RNA SPEC QL NAA+PROBE: NEGATIVE
RSV RNA SPEC QL NAA+PROBE: NEGATIVE
SARS-COV-2 RNA RESP QL NAA+PROBE: NEGATIVE

## 2025-03-31 RX ORDER — ALBUTEROL SULFATE 90 UG/1
2 INHALANT RESPIRATORY (INHALATION) EVERY 4 HOURS PRN
Qty: 1 EACH | Refills: 0 | Status: SHIPPED | OUTPATIENT
Start: 2025-03-31

## 2025-03-31 RX ORDER — DEXTROMETHORPHAN HYDROBROMIDE AND PROMETHAZINE HYDROCHLORIDE 15; 6.25 MG/5ML; MG/5ML
5 SYRUP ORAL 4 TIMES DAILY PRN
Qty: 120 ML | Refills: 0 | Status: SHIPPED | OUTPATIENT
Start: 2025-03-31

## 2025-03-31 ASSESSMENT — ENCOUNTER SYMPTOMS
EYE DISCHARGE: 0
EYE REDNESS: 0
MYALGIAS: 0
NAUSEA: 0
VOMITING: 0
WEIGHT LOSS: 0

## 2025-03-31 NOTE — LETTER
March 31, 2025         Patient: Joaquin Donaldson   YOB: 1975   Date of Visit: 3/31/2025           To Whom it May Concern:    Joaquin Donaldson was seen in my clinic on 3/31/2025. Please excuse work absences due to illness. He may return when improving and without fever for 24 hours.     Sincerely,           Ortiz Alexandre M.D.  Electronically Signed

## 2025-03-31 NOTE — PROGRESS NOTES
"Subjective     Joaquin Donaldson is a 49 y.o. male who presents with Cough (Runny nose,chills,chest pain,headache,x3 days)            2 days productive cough without blood in sputum.  Nasal congestion.  Headache.  Subjective fever/chills. Burning chest pain.  Shortness of breath.  Wheeze.  No PMH asthma or pneumonia.  No other aggravating alleviating factors.        Review of Systems   Constitutional:  Negative for malaise/fatigue and weight loss.   Eyes:  Negative for discharge and redness.   Gastrointestinal:  Negative for nausea and vomiting.   Musculoskeletal:  Negative for joint pain and myalgias.   Skin:  Negative for itching and rash.              Objective     /62   Pulse 80   Temp 36.9 °C (98.5 °F) (Temporal)   Resp 18   Ht 1.854 m (6' 1\")   Wt 102 kg (225 lb)   SpO2 96%   BMI 29.69 kg/m²      Physical Exam  Constitutional:       General: He is not in acute distress.     Appearance: He is well-developed.   HENT:      Head: Normocephalic and atraumatic.      Right Ear: Tympanic membrane normal.      Left Ear: Tympanic membrane normal.      Nose: Congestion present.      Mouth/Throat:      Mouth: Mucous membranes are moist.      Pharynx: No posterior oropharyngeal erythema.   Eyes:      Conjunctiva/sclera: Conjunctivae normal.   Cardiovascular:      Rate and Rhythm: Normal rate and regular rhythm.      Heart sounds: Normal heart sounds. No murmur heard.  Pulmonary:      Effort: Pulmonary effort is normal.      Breath sounds: Normal breath sounds. No wheezing.   Skin:     General: Skin is warm and dry.      Findings: No rash.   Neurological:      Mental Status: He is alert.               1. Acute cough  POCT CEPHEID COV-2, FLU A/B, RSV - PCR    promethazine-dextromethorphan (PROMETHAZINE-DM) 6.25-15 MG/5ML syrup      2. Shortness of breath  POCT CEPHEID COV-2, FLU A/B, RSV - PCR    albuterol 108 (90 Base) MCG/ACT Aero Soln inhalation aerosol        Differential diagnosis, natural history, " supportive care, and indications for immediate follow-up were discussed.     F/u studies

## 2025-04-07 ENCOUNTER — OFFICE VISIT (OUTPATIENT)
Dept: URGENT CARE | Facility: PHYSICIAN GROUP | Age: 50
End: 2025-04-07
Payer: COMMERCIAL

## 2025-04-07 VITALS
RESPIRATION RATE: 14 BRPM | SYSTOLIC BLOOD PRESSURE: 124 MMHG | BODY MASS INDEX: 29.03 KG/M2 | HEART RATE: 78 BPM | HEIGHT: 73 IN | DIASTOLIC BLOOD PRESSURE: 70 MMHG | WEIGHT: 219 LBS | OXYGEN SATURATION: 95 % | TEMPERATURE: 97.8 F

## 2025-04-07 DIAGNOSIS — J22 ACUTE RESPIRATORY INFECTION: ICD-10-CM

## 2025-04-07 DIAGNOSIS — R05.1 ACUTE COUGH: ICD-10-CM

## 2025-04-07 PROCEDURE — 99213 OFFICE O/P EST LOW 20 MIN: CPT | Performed by: FAMILY MEDICINE

## 2025-04-07 PROCEDURE — 3078F DIAST BP <80 MM HG: CPT | Performed by: FAMILY MEDICINE

## 2025-04-07 PROCEDURE — 3074F SYST BP LT 130 MM HG: CPT | Performed by: FAMILY MEDICINE

## 2025-04-07 RX ORDER — DOXYCYCLINE HYCLATE 100 MG
100 TABLET ORAL 2 TIMES DAILY
Qty: 14 TABLET | Refills: 0 | Status: SHIPPED | OUTPATIENT
Start: 2025-04-07 | End: 2025-04-14

## 2025-04-07 RX ORDER — BENZONATATE 100 MG/1
100 CAPSULE ORAL 3 TIMES DAILY PRN
Qty: 30 CAPSULE | Refills: 0 | Status: SHIPPED | OUTPATIENT
Start: 2025-04-07

## 2025-04-07 ASSESSMENT — ENCOUNTER SYMPTOMS
MYALGIAS: 0
NAUSEA: 0
DIZZINESS: 0
FEVER: 0
SPUTUM PRODUCTION: 1
SHORTNESS OF BREATH: 0
COUGH: 1
WHEEZING: 0
VOMITING: 0
SORE THROAT: 0
CHILLS: 0

## 2025-04-07 NOTE — PROGRESS NOTES
Subjective:   Joaquin Donaldson is a 49 y.o. male who presents for Cough (Cough x 2 weeks. Today coughed up blood. )        Cough  This is a new (Reports cough for the past 2 weeks, reports intermittent blood in sputum over the past 3 days) problem. Episode onset: 2 weeks. The problem has been waxing and waning. The cough is Productive of sputum (Reports streaks of blood in the sputum). Pertinent negatives include no chills, fever, myalgias, rash, sore throat, shortness of breath or wheezing. Associated symptoms comments: Reports recent negative COVID-19, influenza A and B testing March 30th 2025. He has tried rest for the symptoms. The treatment provided mild relief.     PMH:  has a past medical history of Bronchitis (02/2020), GERD (gastroesophageal reflux disease), and Psychiatric problem.  MEDS:   Current Outpatient Medications:     doxycycline (VIBRAMYCIN) 100 MG Tab, Take 1 Tablet by mouth 2 times a day for 7 days., Disp: 14 Tablet, Rfl: 0    benzonatate (TESSALON) 100 MG Cap, Take 1 Capsule by mouth 3 times a day as needed for Cough., Disp: 30 Capsule, Rfl: 0    promethazine-dextromethorphan (PROMETHAZINE-DM) 6.25-15 MG/5ML syrup, Take 5 mL by mouth 4 times a day as needed for Cough., Disp: 120 mL, Rfl: 0    albuterol 108 (90 Base) MCG/ACT Aero Soln inhalation aerosol, Inhale 2 Puffs every four hours as needed for Shortness of Breath., Disp: 1 Each, Rfl: 0    promethazine (PHENERGAN) 25 MG Tab, Take 1 Tablet by mouth every 6 hours as needed for Nausea/Vomiting. (Patient not taking: Reported on 3/31/2025), Disp: 10 Tablet, Rfl: 0    ondansetron (ZOFRAN ODT) 4 MG TABLET DISPERSIBLE, Take 1 Tablet by mouth every 8 hours as needed for Nausea/Vomiting., Disp: 6 Tablet, Rfl: 0    naproxen (NAPROSYN) 500 MG Tab, Take 1 Tablet by mouth 2 times a day with meals., Disp: 60 Tablet, Rfl: 0    albuterol 108 (90 Base) MCG/ACT Aero Soln inhalation aerosol, Inhale 2 Puffs every 6 hours as needed for Shortness of  "Breath., Disp: 8.5 g, Rfl: 0    ALPRAZolam (XANAX) 0.25 MG Tab, TAKE 1 TABLET BY MOUTH ONCE DAILY AS NEEDED FOR 30 DAYS, Disp: , Rfl:     tadalafil (CIALIS) 10 MG tablet, TAKE ONE-HALF TABLET TO ONE TABLET BY MOUTH 1 HOUR PRIOR TO SEXUAL ACTIVITY., Disp: , Rfl:     fluoxetine (PROZAC) 40 MG capsule, Take 40 mg by mouth., Disp: , Rfl:   ALLERGIES: No Known Allergies  SURGHX:   Past Surgical History:   Procedure Laterality Date    CA MUSC/TENDON REPAIR EACH ARM/ELBOW Left 7/17/2024    Procedure: LEFT PECTORALIS MAJOR OPEN TENDON REPAIR, REPAIRS AS INDICATED;  Surgeon: Klaus Bolanos M.D.;  Location: [facility];  Service: Orthopedics    INGUINAL HERNIA REPAIR ROBOTIC XI Bilateral 7/23/2020    Procedure: REPAIR, HERNIA, INGUINAL, ROBOT-ASSISTED, USING [email or web address] XI-WITH MESH;  Surgeon: Radha Cruz M.D.;  Location: SURGERY [facility];  Service: Gen Robotic    HERNIA REPAIR  1995    KNEE ARTHROSCOPY Right      SOCHX:  reports that he has quit smoking. His smoking use included cigarettes. He has never used smokeless tobacco. He reports current alcohol use of about 2.4 oz of alcohol per week. He reports current drug use.  FH: History reviewed. No pertinent family history.  Review of Systems   Constitutional:  Negative for chills and fever.   HENT:  Negative for sore throat.    Respiratory:  Positive for cough and sputum production. Negative for shortness of breath and wheezing.    Gastrointestinal:  Negative for nausea and vomiting.   Musculoskeletal:  Negative for myalgias.   Skin:  Negative for rash.   Neurological:  Negative for dizziness.        Objective:   /70 (BP Location: Left arm, Patient Position: Sitting, BP Cuff Size: Adult long)   Pulse 78   Temp 36.6 °C (97.8 °F) (Temporal)   Resp 14   Ht 1.854 m (6' 1\")   Wt 99.3 kg (219 lb)   SpO2 95%   BMI 28.89 kg/m²   Physical Exam  Vitals and nursing note reviewed.   Constitutional:       General: He is not in acute distress.    "  Appearance: He is well-developed.   HENT:      Head: Normocephalic and atraumatic.      Right Ear: External ear normal.      Left Ear: External ear normal.      Nose: Nose normal.      Mouth/Throat:      Mouth: Mucous membranes are moist.      Pharynx: No posterior oropharyngeal erythema.   Eyes:      Conjunctiva/sclera: Conjunctivae normal.   Cardiovascular:      Rate and Rhythm: Normal rate.   Pulmonary:      Effort: Pulmonary effort is normal. No respiratory distress.      Breath sounds: Rhonchi (Few) present. No wheezing.   Abdominal:      General: There is no distension.   Musculoskeletal:         General: Normal range of motion.   Skin:     General: Skin is warm and dry.   Neurological:      General: No focal deficit present.      Mental Status: He is alert and oriented to person, place, and time. Mental status is at baseline.      Gait: Gait (gait at baseline) normal.   Psychiatric:         Judgment: Judgment normal.           Assessment/Plan:   1. Acute respiratory infection  - doxycycline (VIBRAMYCIN) 100 MG Tab; Take 1 Tablet by mouth 2 times a day for 7 days.  Dispense: 14 Tablet; Refill: 0    2. Acute cough  - benzonatate (TESSALON) 100 MG Cap; Take 1 Capsule by mouth 3 times a day as needed for Cough.  Dispense: 30 Capsule; Refill: 0    Medical decision-making/course: The patient remained afebrile, hemodynamically and neurologically stable with no evidence of respiratory compromise throughout the urgent care course.  There was no immediate clinical indication for the necessity of emergency department evaluation or inpatient admission and the patient was amendable to a trial of outpatient management.        In the course of preparing for this visit with review of the pertinent past medical history, recent and past clinic visits, current medications, and performing chart, immunization, medical history and medication reconciliation, and in the further course of obtaining the current history pertinent to  the clinic visit today, performing an exam and evaluation, ordering and independently evaluating labs, tests  , and/or procedures, prescribing any recommended new medications as noted above, providing any pertinent counseling and education and recommending further coordination of care including recommendations for symptomatic and supportive measures and drafting work excuse letter, at least  15 minutes of total time were spent during this encounter.      Discussed close monitoring, return precautions, and supportive measures of maintaining adequate fluid hydration and caloric intake, relative rest and symptom management as needed for pain and/or fever.    Differential diagnosis, natural history, supportive care, and indications for immediate follow-up discussed.     Advised the patient to follow-up with the primary care physician for recheck, reevaluation, and consideration of further management.    Please note that this dictation was created using voice recognition software. I have worked with consultants from the vendor as well as technical experts from [employer] to optimize the interface. I have made every reasonable attempt to correct obvious errors, but I expect that there are errors of grammar and possibly content that I did not discover before finalizing the note.

## 2025-04-07 NOTE — LETTER
April 7, 2025         Patient: Joaquin Donaldson   YOB: 1975   Date of Visit: 4/7/2025           To Whom it May Concern:    Joaquin Donaldson was seen in my clinic on 4/7/2025. He may return to work on 4/9/2025.    If you have any questions or concerns, please don't hesitate to call.        Sincerely,           Tan Wilde M.D.  Electronically Signed

## 2025-04-07 NOTE — PATIENT INSTRUCTIONS
Bronchitis  Bronchitis is a problem of the air tubes leading to your lungs. This problem makes it hard for air to get in and out of the lungs. You may cough a lot because your air tubes are narrow. Going without care can cause lasting (chronic) bronchitis.  HOME CARE   Drink enough fluids to keep your pee (urine) clear or pale yellow.  Use a cool mist humidifier.  Quit smoking if you smoke. If you keep smoking, the bronchitis might not get better.  Only take medicine as told by your doctor.  GET HELP RIGHT AWAY IF:   Coughing keeps you awake.  You start to wheeze.  You become more sick or weak.  You have a hard time breathing or get short of breath.  You cough up blood.  Coughing lasts more than 2 weeks.  You have a fever.  Your baby is older than 3 months with a rectal temperature of 102° F (38.9° C) or higher.  Your baby is 3 months old or younger with a rectal temperature of 100.4° F (38° C) or higher.  MAKE SURE YOU:  Understand these instructions.  Will watch your condition.  Will get help right away if you are not doing well or get worse.  Document Released: 06/05/2009 Document Revised: 03/11/2013 Document Reviewed: 11/19/2010  [employer]® Patient Information ©2014 [employer].

## 2025-05-16 ENCOUNTER — OFFICE VISIT (OUTPATIENT)
Dept: URGENT CARE | Facility: PHYSICIAN GROUP | Age: 50
End: 2025-05-16
Payer: COMMERCIAL

## 2025-05-16 ENCOUNTER — HOSPITAL ENCOUNTER (OUTPATIENT)
Dept: RADIOLOGY | Facility: MEDICAL CENTER | Age: 50
End: 2025-05-16
Attending: FAMILY MEDICINE
Payer: COMMERCIAL

## 2025-05-16 VITALS
WEIGHT: 232 LBS | BODY MASS INDEX: 30.75 KG/M2 | TEMPERATURE: 97.3 F | DIASTOLIC BLOOD PRESSURE: 50 MMHG | OXYGEN SATURATION: 98 % | HEIGHT: 73 IN | HEART RATE: 60 BPM | SYSTOLIC BLOOD PRESSURE: 100 MMHG

## 2025-05-16 DIAGNOSIS — R40.20 LOC (LOSS OF CONSCIOUSNESS) (HCC): ICD-10-CM

## 2025-05-16 DIAGNOSIS — R40.20 LOC (LOSS OF CONSCIOUSNESS) (HCC): Primary | ICD-10-CM

## 2025-05-16 PROCEDURE — 3074F SYST BP LT 130 MM HG: CPT | Performed by: FAMILY MEDICINE

## 2025-05-16 PROCEDURE — 70450 CT HEAD/BRAIN W/O DYE: CPT

## 2025-05-16 PROCEDURE — 3078F DIAST BP <80 MM HG: CPT | Performed by: FAMILY MEDICINE

## 2025-05-16 PROCEDURE — 99214 OFFICE O/P EST MOD 30 MIN: CPT | Performed by: FAMILY MEDICINE

## 2025-05-16 ASSESSMENT — ENCOUNTER SYMPTOMS
HEADACHES: 1
WEAKNESS: 0
TREMORS: 0
FOCAL WEAKNESS: 0
LOSS OF CONSCIOUSNESS: 1
TINGLING: 0
EYES NEGATIVE: 1
SPEECH CHANGE: 0
SENSORY CHANGE: 0
RESPIRATORY NEGATIVE: 1
GASTROINTESTINAL NEGATIVE: 1
CONSTITUTIONAL NEGATIVE: 1
SEIZURES: 0
CARDIOVASCULAR NEGATIVE: 1

## 2025-05-16 NOTE — PROGRESS NOTES
"Subjective:   Joaquin Donaldson is a 49 y.o. male who presents for Headache (X1 month every day, painful, felt like he got hit in the head, throbbing, spotted vision, falls and fainting)      Headache      Review of Systems   Constitutional: Negative.    HENT: Negative.     Eyes: Negative.    Respiratory: Negative.     Cardiovascular: Negative.    Gastrointestinal: Negative.    Genitourinary: Negative.    Skin: Negative.    Neurological:  Positive for loss of consciousness and headaches. Negative for tingling, tremors, sensory change, speech change, focal weakness, seizures and weakness.       Medications, Allergies, and current problem list reviewed today in Epic.     Objective:     /50 (BP Location: Left arm, Patient Position: Sitting, BP Cuff Size: Adult)   Pulse 60   Temp 36.3 °C (97.3 °F) (Temporal)   Ht 1.854 m (6' 1\")   Wt 105 kg (232 lb)   SpO2 98%     Physical Exam  Vitals and nursing note reviewed.   Constitutional:       Appearance: Normal appearance.   HENT:      Head: Normocephalic and atraumatic.      Nose: Nose normal.      Mouth/Throat:      Pharynx: Oropharynx is clear.   Cardiovascular:      Rate and Rhythm: Normal rate and regular rhythm.      Pulses: Normal pulses.      Heart sounds: Normal heart sounds.   Pulmonary:      Effort: Pulmonary effort is normal.      Breath sounds: Normal breath sounds.   Abdominal:      General: Abdomen is flat.      Palpations: Abdomen is soft.   Musculoskeletal:      Cervical back: Normal range of motion and neck supple.   Neurological:      General: No focal deficit present.      Mental Status: He is alert and oriented to person, place, and time. Mental status is at baseline.      Cranial Nerves: No cranial nerve deficit.      Sensory: No sensory deficit.      Motor: No weakness.      Coordination: Coordination normal.      Gait: Gait normal.      Deep Tendon Reflexes: Reflexes normal.         Assessment/Plan:     Diagnosis and associated orders: "     1. LOC (loss of consciousness) (HCC)  CT-HEAD W/O         Comments/MDM:     Follow up with his primary         Differential diagnosis, natural history, supportive care, and indications for immediate follow-up discussed.    Advised the patient to follow-up with the primary care physician for recheck, reevaluation, and consideration of further management.    Please note that this dictation was created using voice recognition software. I have made a reasonable attempt to correct obvious errors, but I expect that there are errors of grammar and possibly content that I did not discover before finalizing the note.    This note was electronically signed by Castillo Mathew M.D.

## 2025-06-10 ENCOUNTER — OFFICE VISIT (OUTPATIENT)
Dept: URGENT CARE | Facility: PHYSICIAN GROUP | Age: 50
End: 2025-06-10
Payer: COMMERCIAL

## 2025-06-10 VITALS
HEIGHT: 73 IN | BODY MASS INDEX: 29.55 KG/M2 | SYSTOLIC BLOOD PRESSURE: 120 MMHG | HEART RATE: 68 BPM | DIASTOLIC BLOOD PRESSURE: 80 MMHG | WEIGHT: 223 LBS | TEMPERATURE: 98.5 F | OXYGEN SATURATION: 93 % | RESPIRATION RATE: 16 BRPM

## 2025-06-10 DIAGNOSIS — R68.89 FLU-LIKE SYMPTOMS: Primary | ICD-10-CM

## 2025-06-10 PROCEDURE — 99213 OFFICE O/P EST LOW 20 MIN: CPT | Performed by: FAMILY MEDICINE

## 2025-06-10 PROCEDURE — 3074F SYST BP LT 130 MM HG: CPT | Performed by: FAMILY MEDICINE

## 2025-06-10 PROCEDURE — 3079F DIAST BP 80-89 MM HG: CPT | Performed by: FAMILY MEDICINE

## 2025-06-10 RX ORDER — BENZONATATE 100 MG/1
100 CAPSULE ORAL 3 TIMES DAILY PRN
Qty: 60 CAPSULE | Refills: 0 | Status: SHIPPED | OUTPATIENT
Start: 2025-06-10

## 2025-06-10 RX ORDER — DEXTROMETHORPHAN HYDROBROMIDE AND PROMETHAZINE HYDROCHLORIDE 15; 6.25 MG/5ML; MG/5ML
5 SYRUP ORAL EVERY EVENING
Qty: 118 ML | Refills: 0 | Status: SHIPPED | OUTPATIENT
Start: 2025-06-10

## 2025-06-10 ASSESSMENT — ENCOUNTER SYMPTOMS
HEADACHES: 1
COUGH: 1

## 2025-06-10 NOTE — PROGRESS NOTES
"Subjective:     Joaquin Donaldson is a 49 y.o. male who presents for Cold Symptoms (Headache, runny nose, cough, tired, x5 days )    HPI  Pt presents for evaluation of an acute problem  Pt with an illness the past 5 days   Started with headache and rhinorrhea   Progressed and became more fatigued   Having some sweating and thinks he is having fevers   Having productive cough   Having myalgias before, but improving  No vomiting or diarrhea     Review of Systems   HENT:  Positive for congestion.    Respiratory:  Positive for cough.    Neurological:  Positive for headaches.     PMH:  has a past medical history of Bronchitis (02/2020), GERD (gastroesophageal reflux disease), and Psychiatric problem.  MEDS: Current Medications[1]  ALLERGIES: Allergies[2]  SURGHX: Past Surgical History[3]  SOCHX:  reports that he has quit smoking. His smoking use included cigarettes. He has never used smokeless tobacco. He reports current alcohol use of about 2.4 oz of alcohol per week. He reports current drug use.     Objective:   /80 (BP Location: Right arm, Patient Position: Sitting, BP Cuff Size: Adult)   Pulse 68   Temp 36.9 °C (98.5 °F) (Temporal)   Resp 16   Ht 1.854 m (6' 1\")   Wt 101 kg (223 lb)   SpO2 93%   BMI 29.42 kg/m²     Physical Exam  Constitutional:       General: He is not in acute distress.     Appearance: He is well-developed. He is not diaphoretic.   HENT:      Head: Normocephalic and atraumatic.      Right Ear: Tympanic membrane, ear canal and external ear normal.      Left Ear: Tympanic membrane, ear canal and external ear normal.      Nose: Congestion present.      Mouth/Throat:      Mouth: Mucous membranes are moist.      Pharynx: Oropharynx is clear. No oropharyngeal exudate or posterior oropharyngeal erythema.   Neck:      Trachea: No tracheal deviation.   Cardiovascular:      Rate and Rhythm: Normal rate and regular rhythm.      Heart sounds: Normal heart sounds.   Pulmonary:      Effort: " Pulmonary effort is normal. No respiratory distress.      Breath sounds: Normal breath sounds. No wheezing or rales.   Musculoskeletal:         General: Normal range of motion.      Cervical back: Normal range of motion and neck supple. No tenderness.   Lymphadenopathy:      Cervical: No cervical adenopathy.   Skin:     General: Skin is warm and dry.      Findings: No rash.   Neurological:      Mental Status: He is alert.   Psychiatric:         Behavior: Behavior normal.         Thought Content: Thought content normal.         Judgment: Judgment normal.         Assessment/Plan:   Assessment    1. Flu-like symptoms  - promethazine-dextromethorphan (PROMETHAZINE-DM) 6.25-15 MG/5ML syrup; Take 5 mL by mouth every evening.  Dispense: 118 mL; Refill: 0  - benzonatate (TESSALON) 100 MG Cap; Take 1 Capsule by mouth 3 times a day as needed for Cough.  Dispense: 60 Capsule; Refill: 0    Patient with flulike illness.  No sign of secondary infection at this time.  He is outside the treatment window for COVID or influenza.  Reviewed supportive care measures and expected course of recovery.  All questions answered, note for work given, and will follow-up in the urgent care as needed.         [1]   Current Outpatient Medications:     promethazine-dextromethorphan (PROMETHAZINE-DM) 6.25-15 MG/5ML syrup, Take 5 mL by mouth every evening., Disp: 118 mL, Rfl: 0    benzonatate (TESSALON) 100 MG Cap, Take 1 Capsule by mouth 3 times a day as needed for Cough., Disp: 60 Capsule, Rfl: 0    albuterol 108 (90 Base) MCG/ACT Aero Soln inhalation aerosol, Inhale 2 Puffs every four hours as needed for Shortness of Breath., Disp: 1 Each, Rfl: 0    naproxen (NAPROSYN) 500 MG Tab, Take 1 Tablet by mouth 2 times a day with meals., Disp: 60 Tablet, Rfl: 0    albuterol 108 (90 Base) MCG/ACT Aero Soln inhalation aerosol, Inhale 2 Puffs every 6 hours as needed for Shortness of Breath., Disp: 8.5 g, Rfl: 0    ALPRAZolam (XANAX) 0.25 MG Tab, TAKE 1  TABLET BY MOUTH ONCE DAILY AS NEEDED FOR 30 DAYS, Disp: , Rfl:     tadalafil (CIALIS) 10 MG tablet, TAKE ONE-HALF TABLET TO ONE TABLET BY MOUTH 1 HOUR PRIOR TO SEXUAL ACTIVITY., Disp: , Rfl:     fluoxetine (PROZAC) 40 MG capsule, Take 40 mg by mouth., Disp: , Rfl:   [2] No Known Allergies  [3]   Past Surgical History:  Procedure Laterality Date    HI MUSC/TENDON REPAIR EACH ARM/ELBOW Left 7/17/2024    Procedure: LEFT PECTORALIS MAJOR OPEN TENDON REPAIR, REPAIRS AS INDICATED;  Surgeon: Klaus Bolnaos M.D.;  Location: [facility] [place];  Service: Orthopedics    INGUINAL HERNIA REPAIR ROBOTIC XI Bilateral 7/23/2020    Procedure: REPAIR, HERNIA, INGUINAL, ROBOT-ASSISTED, USING [employer] XI-WITH MESH;  Surgeon: Radha Cruz M.D.;  Location: SURGERY [facility];  Service: Gen Robotic    HERNIA REPAIR  1995    KNEE ARTHROSCOPY Right

## 2025-06-10 NOTE — LETTER
Leandra 10, 2025    To Whom It May Concern:         This is confirmation that Joaquin Donaldson attended his scheduled appointment with Salo Felipe M.D. on 6/10/25.  Please excuse him from any days missed from work.  He is anticipated to be well enough to return by 6/13/2025.  He may return sooner if feeling better more quickly than anticipated.         If you have any questions please do not hesitate to call me at the phone number listed below.    Sincerely,          Salo Felipe M.D.  [phone]

## (undated) DEVICE — GLOVE BIOGEL PI INDICATOR SZ 8.0 SURGICAL PF LF -(50/BX 4BX/CA)

## (undated) DEVICE — Device

## (undated) DEVICE — SENSOR SPO2 NEO LNCS ADHESIVE (20/BX) SEE USER NOTES

## (undated) DEVICE — GLOVE SZ 8 BIOGEL PI MICRO - PF LF (50PR/BX)

## (undated) DEVICE — SUTURE 2-0 20CM STRATAFIX SPIRAL SH NEEDLE (12/BX)

## (undated) DEVICE — SHEARS MONOPOLAR CURVED  DA VINCI 10X'S REUSABLE

## (undated) DEVICE — PROTECTOR ULNA NERVE - (36PR/CA)

## (undated) DEVICE — GOWN WARMING STANDARD FLEX - (30/CA)

## (undated) DEVICE — SEAL 5MM-8MM UNIVERSAL  BOX OF 10

## (undated) DEVICE — CANISTER SUCTION 3000ML MECHANICAL FILTER AUTO SHUTOFF MEDI-VAC NONSTERILE LF DISP  (40EA/CA)

## (undated) DEVICE — CHLORAPREP 26 ML APPLICATOR - ORANGE TINT(25/CA)

## (undated) DEVICE — LACTATED RINGERS INJ 1000 ML - (14EA/CA 60CA/PF)

## (undated) DEVICE — GLOVE BIOGEL PI INDICATOR SZ 6.5 SURGICAL PF LF - (50/BX 4BX/CA)

## (undated) DEVICE — SET EXTENSION WITH 2 PORTS (48EA/CA) ***PART #2C8610 IS A SUBSTITUTE*****

## (undated) DEVICE — DRAPE STRLE REG TOWEL 18X24 - (10/BX 4BX/CA)"

## (undated) DEVICE — WATER IRRIGATION STERILE 1000ML (12EA/CA)

## (undated) DEVICE — DRAPE COLUMN  BOX OF 20

## (undated) DEVICE — KIT ANESTHESIA W/CIRCUIT & 3/LT BAG W/FILTER (20EA/CA)

## (undated) DEVICE — ROBOTIC SURGERY SERVICES

## (undated) DEVICE — SUCTION INSTRUMENT YANKAUER BULBOUS TIP W/O VENT (50EA/CA)

## (undated) DEVICE — TUBE CONNECT SUCTION CLEAR 120 X 1/4" (50EA/CA)"

## (undated) DEVICE — SODIUM CHL IRRIGATION 0.9% 1000ML (12EA/CA)

## (undated) DEVICE — ELECTRODE DUAL RETURN W/ CORD - (50/PK)

## (undated) DEVICE — TUBING CLEARLINK DUO-VENT - C-FLO (48EA/CA)

## (undated) DEVICE — SUTURE 3-0 VICRYL PLUS SH - 27 INCH (36/BX)

## (undated) DEVICE — SUTURE GENERAL

## (undated) DEVICE — SYRINGE DISP. 12 CC LL - (100/BX)

## (undated) DEVICE — SLEEVE, VASO, THIGH, MED

## (undated) DEVICE — TUBE E-T HI-LO CUFF 7.5MM (10EA/PK)

## (undated) DEVICE — TUBE NG SALEM SUMP 16FR (50EA/CA)

## (undated) DEVICE — TROCAR 5X100 NON BLADED Z-TH - READ KII (6/BX)

## (undated) DEVICE — SYSTEM CLEARIFY VISUALIZATION (10EA/PK)

## (undated) DEVICE — NEEDLE DRIVER MEGA SUTURECUT DA VINCI 15X'S REUSABLE

## (undated) DEVICE — HEAD HOLDER JUNIOR/ADULT

## (undated) DEVICE — DRAPE ARM  BOX OF 20

## (undated) DEVICE — NEPTUNE 4 PORT MANIFOLD - (20/PK)

## (undated) DEVICE — COVER TIP ENDOWRIST HOT SHEAR - (10EA/BX) DA VINCI

## (undated) DEVICE — SUTURE 4-0 MONOCRYL PLUS PS-2 - 27 INCH (36/BX)

## (undated) DEVICE — GOWN SURGEONS X-LARGE - DISP. (30/CA)

## (undated) DEVICE — GLOVE BIOGEL SZ 7.5 SURGICAL PF LTX - (50PR/BX 4BX/CA)

## (undated) DEVICE — OBTURATOR BLADELESS STANDARD 8MM (6EA/BX)

## (undated) DEVICE — SET LEADWIRE 5 LEAD BEDSIDE DISPOSABLE ECG (1SET OF 5/EA)

## (undated) DEVICE — GLOVE SZ 6.5 BIOGEL PI MICRO - PF LF (50PR/BX)

## (undated) DEVICE — MASK ANESTHESIA ADULT  - (100/CA)

## (undated) DEVICE — ELECTRODE 850 FOAM ADHESIVE - HYDROGEL RADIOTRNSPRNT (50/PK)